# Patient Record
Sex: FEMALE | Race: WHITE | Employment: FULL TIME | URBAN - METROPOLITAN AREA
[De-identification: names, ages, dates, MRNs, and addresses within clinical notes are randomized per-mention and may not be internally consistent; named-entity substitution may affect disease eponyms.]

---

## 2017-04-10 ENCOUNTER — HOSPITAL ENCOUNTER (OUTPATIENT)
Dept: ULTRASOUND IMAGING | Age: 37
Discharge: HOME | End: 2017-04-10
Attending: OBSTETRICS & GYNECOLOGY | Admitting: OBSTETRICS & GYNECOLOGY

## 2017-04-10 DIAGNOSIS — N93.9 ABNORMAL UTERINE BLEEDING: ICD-10-CM

## 2017-04-25 PROBLEM — Z86.711 HISTORY OF PULMONARY EMBOLUS (PE): Status: ACTIVE | Noted: 2017-04-25

## 2017-04-25 PROBLEM — N93.9 ABNORMAL UTERINE BLEEDING (AUB): Status: ACTIVE | Noted: 2017-04-25

## 2017-04-28 ENCOUNTER — HOSPITAL ENCOUNTER (OUTPATIENT)
Dept: SURGERY | Age: 37
Discharge: HOME | End: 2017-04-28
Attending: OBSTETRICS & GYNECOLOGY | Admitting: OBSTETRICS & GYNECOLOGY

## 2017-04-28 VITALS
WEIGHT: 280 LBS | HEART RATE: 84 BPM | BODY MASS INDEX: 42.44 KG/M2 | HEIGHT: 68 IN | SYSTOLIC BLOOD PRESSURE: 135 MMHG | DIASTOLIC BLOOD PRESSURE: 87 MMHG | RESPIRATION RATE: 15 BRPM | TEMPERATURE: 98 F | OXYGEN SATURATION: 98 %

## 2017-04-28 LAB
HCT VFR BLD CALC: 32.3 % (ref 36–48)
HEMOGLOBIN: 10.6 G/DL (ref 12–16)
MCH RBC QN AUTO: 25.3 PG (ref 26–34)
MCHC RBC AUTO-ENTMCNC: 32.8 G/DL (ref 31–36)
MCV RBC AUTO: 77 FL (ref 80–100)
PDW BLD-RTO: 15.9 % (ref 12.4–15.4)
PLATELET # BLD: 255 K/UL (ref 135–450)
PMV BLD AUTO: 7.9 FL (ref 5–10.5)
PREGNANCY, URINE: NEGATIVE
RBC # BLD: 4.19 M/UL (ref 4–5.2)
WBC # BLD: 6.4 K/UL (ref 4–11)

## 2017-04-28 RX ORDER — HYDROMORPHONE HCL 110MG/55ML
0.5 PATIENT CONTROLLED ANALGESIA SYRINGE INTRAVENOUS EVERY 5 MIN PRN
Status: DISCONTINUED | OUTPATIENT
Start: 2017-04-28 | End: 2017-04-29 | Stop reason: HOSPADM

## 2017-04-28 RX ORDER — OXYCODONE HYDROCHLORIDE AND ACETAMINOPHEN 5; 325 MG/1; MG/1
1 TABLET ORAL PRN
Status: COMPLETED | OUTPATIENT
Start: 2017-04-28 | End: 2017-04-28

## 2017-04-28 RX ORDER — LABETALOL HYDROCHLORIDE 5 MG/ML
5 INJECTION, SOLUTION INTRAVENOUS EVERY 10 MIN PRN
Status: DISCONTINUED | OUTPATIENT
Start: 2017-04-28 | End: 2017-04-29 | Stop reason: HOSPADM

## 2017-04-28 RX ORDER — SODIUM CHLORIDE, SODIUM LACTATE, POTASSIUM CHLORIDE, CALCIUM CHLORIDE 600; 310; 30; 20 MG/100ML; MG/100ML; MG/100ML; MG/100ML
INJECTION, SOLUTION INTRAVENOUS CONTINUOUS
Status: DISCONTINUED | OUTPATIENT
Start: 2017-04-28 | End: 2017-04-28 | Stop reason: SDUPTHER

## 2017-04-28 RX ORDER — PROCHLORPERAZINE MALEATE 10 MG
10 TABLET ORAL EVERY 6 HOURS PRN
Qty: 8 TABLET | Refills: 0 | Status: SHIPPED | OUTPATIENT
Start: 2017-04-28 | End: 2018-01-02

## 2017-04-28 RX ORDER — SODIUM CHLORIDE 0.9 % (FLUSH) 0.9 %
10 SYRINGE (ML) INJECTION PRN
Status: DISCONTINUED | OUTPATIENT
Start: 2017-04-28 | End: 2017-04-29 | Stop reason: HOSPADM

## 2017-04-28 RX ORDER — SODIUM CHLORIDE, SODIUM LACTATE, POTASSIUM CHLORIDE, CALCIUM CHLORIDE 600; 310; 30; 20 MG/100ML; MG/100ML; MG/100ML; MG/100ML
INJECTION, SOLUTION INTRAVENOUS CONTINUOUS
Status: DISCONTINUED | OUTPATIENT
Start: 2017-04-28 | End: 2017-04-29 | Stop reason: HOSPADM

## 2017-04-28 RX ORDER — SODIUM CHLORIDE 0.9 % (FLUSH) 0.9 %
10 SYRINGE (ML) INJECTION EVERY 12 HOURS SCHEDULED
Status: DISCONTINUED | OUTPATIENT
Start: 2017-04-28 | End: 2017-04-29 | Stop reason: HOSPADM

## 2017-04-28 RX ORDER — MEPERIDINE HYDROCHLORIDE 25 MG/ML
12.5 INJECTION INTRAMUSCULAR; INTRAVENOUS; SUBCUTANEOUS EVERY 5 MIN PRN
Status: DISCONTINUED | OUTPATIENT
Start: 2017-04-28 | End: 2017-04-29 | Stop reason: HOSPADM

## 2017-04-28 RX ORDER — HYDROMORPHONE HCL 110MG/55ML
0.25 PATIENT CONTROLLED ANALGESIA SYRINGE INTRAVENOUS EVERY 5 MIN PRN
Status: DISCONTINUED | OUTPATIENT
Start: 2017-04-28 | End: 2017-04-29 | Stop reason: HOSPADM

## 2017-04-28 RX ORDER — HYDRALAZINE HYDROCHLORIDE 20 MG/ML
5 INJECTION INTRAMUSCULAR; INTRAVENOUS EVERY 10 MIN PRN
Status: DISCONTINUED | OUTPATIENT
Start: 2017-04-28 | End: 2017-04-29 | Stop reason: HOSPADM

## 2017-04-28 RX ORDER — SODIUM CHLORIDE 0.9 % (FLUSH) 0.9 %
10 SYRINGE (ML) INJECTION PRN
Status: DISCONTINUED | OUTPATIENT
Start: 2017-04-28 | End: 2017-04-28 | Stop reason: SDUPTHER

## 2017-04-28 RX ORDER — LIDOCAINE HYDROCHLORIDE 10 MG/ML
1 INJECTION, SOLUTION EPIDURAL; INFILTRATION; INTRACAUDAL; PERINEURAL
Status: COMPLETED | OUTPATIENT
Start: 2017-04-28 | End: 2017-04-28

## 2017-04-28 RX ORDER — ONDANSETRON 2 MG/ML
4 INJECTION INTRAMUSCULAR; INTRAVENOUS EVERY 10 MIN PRN
Status: DISCONTINUED | OUTPATIENT
Start: 2017-04-28 | End: 2017-04-29 | Stop reason: HOSPADM

## 2017-04-28 RX ORDER — SODIUM CHLORIDE 0.9 % (FLUSH) 0.9 %
10 SYRINGE (ML) INJECTION EVERY 12 HOURS SCHEDULED
Status: DISCONTINUED | OUTPATIENT
Start: 2017-04-28 | End: 2017-04-28 | Stop reason: SDUPTHER

## 2017-04-28 RX ORDER — HYDROCODONE BITARTRATE AND ACETAMINOPHEN 5; 325 MG/1; MG/1
1 TABLET ORAL EVERY 6 HOURS PRN
Qty: 12 TABLET | Refills: 0 | Status: SHIPPED | OUTPATIENT
Start: 2017-04-28 | End: 2017-05-05

## 2017-04-28 RX ORDER — OXYCODONE HYDROCHLORIDE AND ACETAMINOPHEN 5; 325 MG/1; MG/1
2 TABLET ORAL PRN
Status: COMPLETED | OUTPATIENT
Start: 2017-04-28 | End: 2017-04-28

## 2017-04-28 RX ADMIN — SODIUM CHLORIDE, SODIUM LACTATE, POTASSIUM CHLORIDE, CALCIUM CHLORIDE: 600; 310; 30; 20 INJECTION, SOLUTION INTRAVENOUS at 10:56

## 2017-04-28 RX ADMIN — LIDOCAINE HYDROCHLORIDE 1 ML: 10 INJECTION, SOLUTION EPIDURAL; INFILTRATION; INTRACAUDAL; PERINEURAL at 10:56

## 2017-04-28 RX ADMIN — OXYCODONE HYDROCHLORIDE AND ACETAMINOPHEN 1 TABLET: 5; 325 TABLET ORAL at 12:49

## 2017-04-28 ASSESSMENT — PAIN DESCRIPTION - LOCATION: LOCATION: BACK

## 2017-04-28 ASSESSMENT — PAIN SCALES - GENERAL
PAINLEVEL_OUTOF10: 5
PAINLEVEL_OUTOF10: 3
PAINLEVEL_OUTOF10: 5
PAINLEVEL_OUTOF10: 3
PAINLEVEL_OUTOF10: 3

## 2017-04-28 ASSESSMENT — PAIN DESCRIPTION - PAIN TYPE: TYPE: CHRONIC PAIN

## 2017-04-28 ASSESSMENT — PAIN - FUNCTIONAL ASSESSMENT: PAIN_FUNCTIONAL_ASSESSMENT: 0-10

## 2017-04-28 ASSESSMENT — PAIN DESCRIPTION - DESCRIPTORS: DESCRIPTORS: SORE

## 2017-04-28 ASSESSMENT — PAIN DESCRIPTION - ORIENTATION: ORIENTATION: LOWER

## 2017-12-29 ENCOUNTER — SURG/PROC ORDERS (OUTPATIENT)
Dept: OBGYN | Age: 37
End: 2017-12-29

## 2017-12-29 RX ORDER — PHENAZOPYRIDINE HYDROCHLORIDE 100 MG/1
100 TABLET, FILM COATED ORAL
Status: CANCELLED | OUTPATIENT
Start: 2017-12-29 | End: 2017-12-30

## 2017-12-29 RX ORDER — SODIUM CHLORIDE 0.9 % (FLUSH) 0.9 %
10 SYRINGE (ML) INJECTION PRN
Status: CANCELLED | OUTPATIENT
Start: 2017-12-29

## 2017-12-29 RX ORDER — HEPARIN SODIUM 5000 [USP'U]/ML
5000 INJECTION, SOLUTION INTRAVENOUS; SUBCUTANEOUS ONCE
Status: CANCELLED | OUTPATIENT
Start: 2017-12-29

## 2017-12-29 RX ORDER — SODIUM CHLORIDE 0.9 % (FLUSH) 0.9 %
10 SYRINGE (ML) INJECTION EVERY 12 HOURS SCHEDULED
Status: CANCELLED | OUTPATIENT
Start: 2017-12-29

## 2017-12-29 NOTE — H&P
Negative Burning eyes, eye discharge, eye redness and vision changes. Respiratory Negative Dyspnea, hemoptysis and wheezing. Cardio Negative Chest pain and irregular heartbeat/palpitations. GI Negative Abdominal pain, change in bowel habits and nausea.  Negative Change in urine color, dysuria and hematuria. Endocrine Negative Change in sleep/awake pattern. Neuro Negative Aphasia, dizziness and gait disturbance. Integumentary Negative Photosensitivity and rash. MS Negative Bone/joint symptoms, muscle weakness and numbness in extremity. Hema/Lymph Negative Easy bleeding and lymphadenopathy. Reproductive Negative Breast discharge, breast lump(s), breast pain and dyspareunia. VITAL SIGNS   Time BP mm/Hg Pulse /min Resp /min Temp F Ht ft Ht in Ht cm Wt lb Wt oz BMI kg/m2 BSA m2 O2 Sat%   3:09 /80    5.0 9.00 175.26 279.00  41.20       COMMENTS  Time Comments   3:09 PM Pt. here for Pre-op for [address]. Pt. states she has a history of blood clots, wanted to know if she needs to stop her asprin 325mg prior to surgery. MEASURED BY  Time Measured by   3:09 PM Janelle Juan David RMA     Physical Exam  Exam Findings Details   Constitutional Normal Well developed. Respiratory Normal Auscultation - Normal.   Cardiovascular Normal Regular rhythm. No murmurs, gallops, or rubs. Abdomen Normal Anterior palpation -  No rebound. No abdominal tenderness. No hernia. Skin Normal Inspection - Normal.   Psychiatric Normal Oriented to time, place, person and situation. Appropriate mood and affect. Assessment/Plan  # Detail Type Description    1. Assessment Abnormal uterine bleeding (N93.9). Impression Previously discussed alternative options to surgery and pt continues to desire definitive hysterectomy. Will plan for pre and postoperative heparin for anticoagulation prophylaxis.  Discussed ovarian removal or retention and at pt age recommend retention given she would not be candidate for add back and pt agrees. Recommend robotic laparoscopic hysterectomy due to obesity and prior c/S. Risks of bleeding, infection, damage to surrounding organs, DVT, ileus reviewed. Questions answered and postop instructions reviewed. Pt med list and plan reviewed. 2. Assessment Body mass index (BMI) 40.0-44.9, adult (Z68.41).               Medications (added or continued this visit):  Started Medication Directions Instruction Stopped   11/28/2016 aspirin 325 mg tablet,delayed release take 1 tablet by oral route  every day     11/03/2017 famotidine 20 mg tablet take 1 tablet by oral route  every day     10/12/2017 levothyroxine 75 mcg tablet take 1 tablet by oral route  every day     11/03/2017 metformin 1,000 mg tablet take 1 tablet by oral route 2 times every day with morning and evening meals     09/18/2017 Vitamin B-12 1,000 mcg tablet take 1 Tablet by Oral route  every day       Rendering Provider:  Laura Hoff MD    Electronically signed by:  Laura Hoff MD  12/29/2017 04:55 PM  Document generated by:  Laura Hoff 12/29/2017 04:54 PM  [address]  [address] [place], [place] [address]Lady villanueva   Phone: [phone]  Fax: [phone]    Electronically signed by Laura Hoff MD on 12/29/2017 04:55 PM

## 2018-01-09 PROBLEM — Z90.710 S/P HYSTERECTOMY: Status: ACTIVE | Noted: 2018-01-09

## 2019-01-02 ENCOUNTER — HOSPITAL ENCOUNTER (EMERGENCY)
Age: 39
Discharge: HOME | End: 2019-01-02
Attending: EMERGENCY MEDICINE
Payer: COMMERCIAL

## 2019-01-02 ENCOUNTER — APPOINTMENT (OUTPATIENT)
Dept: GENERAL RADIOLOGY | Age: 39
End: 2019-01-02
Payer: COMMERCIAL

## 2019-01-02 VITALS
HEIGHT: 69 IN | SYSTOLIC BLOOD PRESSURE: 135 MMHG | OXYGEN SATURATION: 99 % | RESPIRATION RATE: 16 BRPM | TEMPERATURE: 98 F | BODY MASS INDEX: 38.8 KG/M2 | DIASTOLIC BLOOD PRESSURE: 80 MMHG | WEIGHT: 262 LBS | HEART RATE: 83 BPM

## 2019-01-02 DIAGNOSIS — S39.012A STRAIN OF LUMBAR REGION, INITIAL ENCOUNTER: Primary | ICD-10-CM

## 2019-01-02 PROCEDURE — 6370000000 HC RX 637 (ALT 250 FOR IP): Performed by: EMERGENCY MEDICINE

## 2019-01-02 PROCEDURE — 72100 X-RAY EXAM L-S SPINE 2/3 VWS: CPT

## 2019-01-02 PROCEDURE — 96372 THER/PROPH/DIAG INJ SC/IM: CPT

## 2019-01-02 PROCEDURE — 99283 EMERGENCY DEPT VISIT LOW MDM: CPT

## 2019-01-02 PROCEDURE — 6360000002 HC RX W HCPCS

## 2019-01-02 RX ORDER — CYCLOBENZAPRINE HCL 10 MG
10 TABLET ORAL 3 TIMES DAILY PRN
Qty: 10 TABLET | Refills: 0 | Status: SHIPPED | OUTPATIENT
Start: 2019-01-02 | End: 2019-01-12

## 2019-01-02 RX ORDER — KETOROLAC TROMETHAMINE 30 MG/ML
30 INJECTION, SOLUTION INTRAMUSCULAR; INTRAVENOUS ONCE
Status: DISCONTINUED | OUTPATIENT
Start: 2019-01-02 | End: 2019-01-03 | Stop reason: HOSPADM

## 2019-01-02 RX ORDER — CYCLOBENZAPRINE HCL 10 MG
10 TABLET ORAL ONCE
Status: COMPLETED | OUTPATIENT
Start: 2019-01-02 | End: 2019-01-02

## 2019-01-02 RX ORDER — KETOROLAC TROMETHAMINE 30 MG/ML
INJECTION, SOLUTION INTRAMUSCULAR; INTRAVENOUS
Status: COMPLETED
Start: 2019-01-02 | End: 2019-01-02

## 2019-01-02 RX ADMIN — KETOROLAC TROMETHAMINE 30 MG: 60 INJECTION, SOLUTION INTRAMUSCULAR at 21:24

## 2019-01-02 RX ADMIN — CYCLOBENZAPRINE HYDROCHLORIDE 10 MG: 10 TABLET, FILM COATED ORAL at 21:24

## 2019-01-02 ASSESSMENT — PAIN SCALES - GENERAL
PAINLEVEL_OUTOF10: 7
PAINLEVEL_OUTOF10: 8

## 2019-01-02 ASSESSMENT — PAIN DESCRIPTION - ORIENTATION: ORIENTATION: LOWER

## 2019-01-02 ASSESSMENT — PAIN DESCRIPTION - LOCATION: LOCATION: BACK

## 2019-01-02 ASSESSMENT — PAIN DESCRIPTION - DESCRIPTORS: DESCRIPTORS: ACHING

## 2019-01-02 ASSESSMENT — PAIN DESCRIPTION - FREQUENCY: FREQUENCY: CONTINUOUS

## 2019-01-02 ASSESSMENT — PAIN DESCRIPTION - PAIN TYPE: TYPE: ACUTE PAIN

## 2019-07-19 ENCOUNTER — HOSPITAL ENCOUNTER (OUTPATIENT)
Dept: MAMMOGRAPHY | Age: 39
Discharge: HOME | End: 2019-07-19
Payer: COMMERCIAL

## 2019-07-19 DIAGNOSIS — Z12.31 ENCOUNTER FOR SCREENING MAMMOGRAM FOR BREAST CANCER: ICD-10-CM

## 2019-07-19 PROCEDURE — 77063 BREAST TOMOSYNTHESIS BI: CPT

## 2019-08-01 ENCOUNTER — HOSPITAL ENCOUNTER (EMERGENCY)
Age: 39
Discharge: HOME | End: 2019-08-01
Attending: EMERGENCY MEDICINE
Payer: COMMERCIAL

## 2019-08-01 ENCOUNTER — APPOINTMENT (OUTPATIENT)
Dept: CT IMAGING | Age: 39
End: 2019-08-01
Payer: COMMERCIAL

## 2019-08-01 VITALS
TEMPERATURE: 99.2 F | OXYGEN SATURATION: 100 % | HEART RATE: 72 BPM | DIASTOLIC BLOOD PRESSURE: 76 MMHG | RESPIRATION RATE: 16 BRPM | HEIGHT: 69 IN | BODY MASS INDEX: 37.03 KG/M2 | WEIGHT: 250 LBS | SYSTOLIC BLOOD PRESSURE: 118 MMHG

## 2019-08-01 DIAGNOSIS — R10.13 ABDOMINAL PAIN, EPIGASTRIC: Primary | ICD-10-CM

## 2019-08-01 LAB
A/G RATIO: 1.2 (ref 1.1–2.2)
ALBUMIN SERPL-MCNC: 4 G/DL (ref 3.4–5)
ALP BLD-CCNC: 81 U/L (ref 40–129)
ALT SERPL-CCNC: 23 U/L (ref 10–40)
ANION GAP SERPL CALCULATED.3IONS-SCNC: 13 MMOL/L (ref 3–16)
AST SERPL-CCNC: 26 U/L (ref 15–37)
BASOPHILS ABSOLUTE: 0 K/UL (ref 0–0.2)
BASOPHILS RELATIVE PERCENT: 0.3 %
BILIRUB SERPL-MCNC: 0.5 MG/DL (ref 0–1)
BILIRUBIN URINE: NEGATIVE
BLOOD, URINE: NEGATIVE
BUN BLDV-MCNC: 12 MG/DL (ref 7–20)
CALCIUM SERPL-MCNC: 9.1 MG/DL (ref 8.3–10.6)
CHLORIDE BLD-SCNC: 98 MMOL/L (ref 99–110)
CLARITY: CLEAR
CO2: 24 MMOL/L (ref 21–32)
COLOR: YELLOW
CREAT SERPL-MCNC: 0.6 MG/DL (ref 0.6–1.1)
EOSINOPHILS ABSOLUTE: 0 K/UL (ref 0–0.6)
EOSINOPHILS RELATIVE PERCENT: 0.1 %
GFR AFRICAN AMERICAN: >60
GFR NON-AFRICAN AMERICAN: >60
GLOBULIN: 3.3 G/DL
GLUCOSE BLD-MCNC: 98 MG/DL (ref 70–99)
GLUCOSE URINE: NEGATIVE MG/DL
HCT VFR BLD CALC: 36.8 % (ref 36–48)
HEMOGLOBIN: 12.1 G/DL (ref 12–16)
KETONES, URINE: NEGATIVE MG/DL
LEUKOCYTE ESTERASE, URINE: NEGATIVE
LIPASE: 29 U/L (ref 13–60)
LYMPHOCYTES ABSOLUTE: 0.6 K/UL (ref 1–5.1)
LYMPHOCYTES RELATIVE PERCENT: 7.4 %
MCH RBC QN AUTO: 24.7 PG (ref 26–34)
MCHC RBC AUTO-ENTMCNC: 32.8 G/DL (ref 31–36)
MCV RBC AUTO: 75.3 FL (ref 80–100)
MICROSCOPIC EXAMINATION: NORMAL
MONOCYTES ABSOLUTE: 0.3 K/UL (ref 0–1.3)
MONOCYTES RELATIVE PERCENT: 3.4 %
NEUTROPHILS ABSOLUTE: 7.6 K/UL (ref 1.7–7.7)
NEUTROPHILS RELATIVE PERCENT: 88.8 %
NITRITE, URINE: NEGATIVE
PDW BLD-RTO: 17.3 % (ref 12.4–15.4)
PH UA: 6 (ref 5–8)
PLATELET # BLD: 250 K/UL (ref 135–450)
PMV BLD AUTO: 8 FL (ref 5–10.5)
POTASSIUM SERPL-SCNC: 3.7 MMOL/L (ref 3.5–5.1)
PROTEIN UA: NEGATIVE MG/DL
RBC # BLD: 4.88 M/UL (ref 4–5.2)
SODIUM BLD-SCNC: 135 MMOL/L (ref 136–145)
SPECIFIC GRAVITY UA: <=1.005 (ref 1–1.03)
TOTAL PROTEIN: 7.3 G/DL (ref 6.4–8.2)
URINE REFLEX TO CULTURE: NORMAL
URINE TYPE: NORMAL
UROBILINOGEN, URINE: 0.2 E.U./DL
WBC # BLD: 8.6 K/UL (ref 4–11)

## 2019-08-01 PROCEDURE — 85025 COMPLETE CBC W/AUTO DIFF WBC: CPT

## 2019-08-01 PROCEDURE — 81003 URINALYSIS AUTO W/O SCOPE: CPT

## 2019-08-01 PROCEDURE — 2580000003 HC RX 258: Performed by: EMERGENCY MEDICINE

## 2019-08-01 PROCEDURE — 74177 CT ABD & PELVIS W/CONTRAST: CPT

## 2019-08-01 PROCEDURE — 83690 ASSAY OF LIPASE: CPT

## 2019-08-01 PROCEDURE — 80053 COMPREHEN METABOLIC PANEL: CPT

## 2019-08-01 PROCEDURE — 6360000004 HC RX CONTRAST MEDICATION: Performed by: EMERGENCY MEDICINE

## 2019-08-01 PROCEDURE — 6370000000 HC RX 637 (ALT 250 FOR IP): Performed by: EMERGENCY MEDICINE

## 2019-08-01 PROCEDURE — 99284 EMERGENCY DEPT VISIT MOD MDM: CPT

## 2019-08-01 PROCEDURE — 6360000002 HC RX W HCPCS: Performed by: EMERGENCY MEDICINE

## 2019-08-01 RX ORDER — FERROUS SULFATE 325(65) MG
325 TABLET ORAL
COMMUNITY

## 2019-08-01 RX ORDER — 0.9 % SODIUM CHLORIDE 0.9 %
1000 INTRAVENOUS SOLUTION INTRAVENOUS ONCE
Status: COMPLETED | OUTPATIENT
Start: 2019-08-01 | End: 2019-08-01

## 2019-08-01 RX ORDER — FAMOTIDINE 20 MG/1
20 TABLET, FILM COATED ORAL 2 TIMES DAILY
Qty: 30 TABLET | Refills: 0 | Status: SHIPPED | OUTPATIENT
Start: 2019-08-01 | End: 2020-08-17 | Stop reason: ALTCHOICE

## 2019-08-01 RX ORDER — PANTOPRAZOLE SODIUM 40 MG/1
40 TABLET, DELAYED RELEASE ORAL DAILY
Qty: 30 TABLET | Refills: 1 | Status: SHIPPED | OUTPATIENT
Start: 2019-08-01

## 2019-08-01 RX ORDER — KETOROLAC TROMETHAMINE 30 MG/ML
30 INJECTION, SOLUTION INTRAMUSCULAR; INTRAVENOUS ONCE
Status: COMPLETED | OUTPATIENT
Start: 2019-08-01 | End: 2019-08-01

## 2019-08-01 RX ORDER — ONDANSETRON 2 MG/ML
4 INJECTION INTRAMUSCULAR; INTRAVENOUS ONCE
Status: COMPLETED | OUTPATIENT
Start: 2019-08-01 | End: 2019-08-01

## 2019-08-01 RX ADMIN — ONDANSETRON 4 MG: 2 INJECTION INTRAMUSCULAR; INTRAVENOUS at 18:15

## 2019-08-01 RX ADMIN — LIDOCAINE HYDROCHLORIDE: 20 SOLUTION ORAL; TOPICAL at 20:18

## 2019-08-01 RX ADMIN — IOPAMIDOL 75 ML: 755 INJECTION, SOLUTION INTRAVENOUS at 18:36

## 2019-08-01 RX ADMIN — KETOROLAC TROMETHAMINE 30 MG: 30 INJECTION, SOLUTION INTRAMUSCULAR at 18:15

## 2019-08-01 RX ADMIN — SODIUM CHLORIDE 1000 ML: 9 INJECTION, SOLUTION INTRAVENOUS at 18:15

## 2019-08-01 ASSESSMENT — PAIN SCALES - GENERAL
PAINLEVEL_OUTOF10: 7

## 2019-08-01 ASSESSMENT — PAIN DESCRIPTION - LOCATION
LOCATION: ABDOMEN
LOCATION: ABDOMEN

## 2019-08-01 ASSESSMENT — PAIN DESCRIPTION - PAIN TYPE
TYPE: ACUTE PAIN
TYPE: ACUTE PAIN

## 2019-08-01 ASSESSMENT — PAIN DESCRIPTION - ORIENTATION
ORIENTATION: RIGHT
ORIENTATION: RIGHT

## 2019-08-01 ASSESSMENT — PAIN DESCRIPTION - DESCRIPTORS: DESCRIPTORS: CONSTANT

## 2019-08-02 NOTE — ED NOTES
Discharge instructions reviewed with Ms. Zain Smart. She verbalized understanding. Copy of discharge instructions and prescriptions given. Ms. Zain Smart was discharged to home in good condition per personal vehicle, friend/family driving. She exited the ED without difficulty.         Markell Leblanc RN  08/01/19 2024

## 2019-08-02 NOTE — ED PROVIDER NOTES
Triage Chief Complaint:    Abdominal Pain (right side abdomial pain started this am, c/o nausea and vomiting)    [place]:  Ash Abernathy is a 34 y.o. female that presents to the emergency department with complaints of having right upper quadrant/midepigastric abdominal pain. The patient started with the main part of the pain at home. The patient states it started earlier this morning. He did wake her from sleep. The patient states been going on throughout the day. Patient states it has not waxed and wanes in severity. The patient states that he did start while she was sleeping. The patient states that she had eaten last approximately 6 PM the night before. Patient states that there was some episodes of vomiting this morning, but nothing else significant. .    ROS:  At least 10 systems reviewed and otherwise acutely negative except as in the [address] Past Medical History:   Diagnosis Date    DVT (deep vein thrombosis) in pregnancy ([facility] [address]     Gall stones     PE (pulmonary embolism)     Polycystic ovarian disease     Thyroid disease      Past Surgical History:   Procedure Laterality Date    BACK SURGERY       SECTION      HYSTERECTOMY  2018    Lap    HYSTEROSCOPY N/A 2017    DILATATION AND CURETTAGE    TONSILLECTOMY AND ADENOIDECTOMY      TUBAL LIGATION       History reviewed. No pertinent family history.   Social History     Socioeconomic History    Marital status:      Spouse name: Not on file    Number of children: Not on file    Years of education: Not on file    Highest education level: Not on file   Occupational History    Not on file   Social Needs    Financial resource strain: Not on file    Food insecurity:     Worry: Not on file     Inability: Not on file    Transportation needs:     Medical: Not on file     Non-medical: Not on file   Tobacco Use    Smoking status: Former Smoker     Packs/day: 0.00     Types: Cigarettes    Smokeless tobacco: Former User  Tobacco comment: 1/28/16   Substance and Sexual Activity    Alcohol use: No    Drug use: No    Sexual activity: Yes     Partners: Male   Lifestyle    Physical activity:     Days per week: Not on file     Minutes per session: Not on file    Stress: Not on file   Relationships    Social connections:     Talks on phone: Not on file     Gets together: Not on file     Attends Yarsani service: Not on file     Active member of club or organization: Not on file     Attends meetings of clubs or organizations: Not on file     Relationship status: Not on file    Intimate partner violence:     Fear of current or ex partner: Not on file     Emotionally abused: Not on file     Physically abused: Not on file     Forced sexual activity: Not on file   Other Topics Concern    Not on file   Social History Narrative    Not on file     Current Facility-Administered Medications   Medication Dose Route Frequency Provider Last Rate Last Dose    aluminum & magnesium hydroxide-simethicone (MAALOX) 30 mL, lidocaine viscous hcl (XYLOCAINE) 5 mL (GI COCKTAIL)   Oral Once Job Tripathi MD         Current Outpatient Medications   Medication Sig Dispense Refill    Cetirizine HCl (ZYRTEC PO) Take by mouth      ferrous sulfate 325 (65 Fe) MG tablet Take 325 mg by mouth daily (with breakfast)      Bisacodyl (DULCOLAX PO) Take by mouth      fluticasone (VERAMYST) 27.5 MCG/SPRAY nasal spray 2 sprays by Each Nostril route daily      famotidine (PEPCID) 20 MG tablet Take 1 tablet by mouth 2 times daily 30 tablet 0    pantoprazole (PROTONIX) 40 MG tablet Take 1 tablet by mouth daily 30 tablet 1    metFORMIN (GLUCOPHAGE) 1000 MG tablet Take 1,000 mg by mouth 2 times daily (with meals)      vitamin B-12 (CYANOCOBALAMIN) 1000 MCG tablet Take 1,000 mcg by mouth daily      famotidine (PEPCID) 40 MG tablet Take 40 mg by mouth daily      aspirin 325 MG EC tablet Take 325 mg by mouth daily      levothyroxine (SYNTHROID) 50 MCG Value Ref Range    Sodium 135 (L) 136 - 145 mmol/L    Potassium 3.7 3.5 - 5.1 mmol/L    Chloride 98 (L) 99 - 110 mmol/L    CO2 24 21 - 32 mmol/L    Anion Gap 13 3 - 16    Glucose 98 70 - 99 mg/dL    BUN 12 7 - 20 mg/dL    CREATININE 0.6 0.6 - 1.1 mg/dL    GFR Non-African American >60 >60    GFR African American >60 >60    Calcium 9.1 8.3 - 10.6 mg/dL    Total Protein 7.3 6.4 - 8.2 g/dL    Alb 4.0 3.4 - 5.0 g/dL    Albumin/Globulin Ratio 1.2 1.1 - 2.2    Total Bilirubin 0.5 0.0 - 1.0 mg/dL    Alkaline Phosphatase 81 40 - 129 U/L    ALT 23 10 - 40 U/L    AST 26 15 - 37 U/L    Globulin 3.3 g/dL   Lipase   Result Value Ref Range    Lipase 29.0 13.0 - 60.0 U/L   Urine, reflex to culture   Result Value Ref Range    Color, UA Yellow Straw/Yellow    Clarity, UA Clear Clear    Glucose, Ur Negative Negative mg/dL    Bilirubin Urine Negative Negative    Ketones, Urine Negative Negative mg/dL    Specific Gravity, UA <=1.005 1.005 - 1.030    Blood, Urine Negative Negative    pH, UA 6.0 5.0 - 8.0    Protein, UA Negative Negative mg/dL    Urobilinogen, Urine 0.2 <2.0 E.U./dL    Nitrite, Urine Negative Negative    Leukocyte Esterase, Urine Negative Negative    Microscopic Examination Not Indicated     Urine Reflex to Culture Not Indicated     Urine Type Not Specified         Radiographs (if obtained):  [] The following radiograph was interpreted by myself in the absence of a radiologist:  [x] Radiologist's Report Reviewed:  CT ABDOMEN PELVIS W IV CONTRAST Additional Contrast? None   Final Result   No acute intra-abdominal abnormality      No change in left adnexal mass since 2016. This has been previously   evaluated by ultrasound      Cholelithiasis             EKG (if obtained): (All EKG's are interpreted by myself in theabsence of a cardiologist)    Procedures    MDM:  After my initial evaluation, the patient does not appear to have any signs of McBurney's point tenderness. Patient also does not have a Pascual sign.   Patient is

## 2020-06-24 ENCOUNTER — HOSPITAL ENCOUNTER (OUTPATIENT)
Dept: GENERAL RADIOLOGY | Age: 40
Discharge: HOME | End: 2020-06-24
Payer: COMMERCIAL

## 2020-06-24 ENCOUNTER — HOSPITAL ENCOUNTER (OUTPATIENT)
Age: 40
Discharge: HOME | End: 2020-06-24
Payer: COMMERCIAL

## 2020-06-24 PROCEDURE — 73630 X-RAY EXAM OF FOOT: CPT

## 2020-08-17 RX ORDER — CITALOPRAM 40 MG/1
40 TABLET ORAL DAILY
COMMUNITY

## 2020-08-17 NOTE — PROGRESS NOTES
PRE OP INSTRUCTION SHEET   1. Do not eat or drink anything after 12 midnight  prior to surgery. This includes no water, chewing gum or mints. 2. Take the following pills will a small sip of water (see MAR)                                        3. Aspirin, Ibuprofen, Advil, Naproxen, Vitamin E, fish oil and other Anti-inflammatory products should be stopped for 5 days before surgery or as directed by your physician. 4. Check with your Doctor regarding stopping Plavix, Coumadin, Lovenox, Fragmin or other blood thinners   5. Do not smoke, and do not drink any alcoholic beverages 24 hours prior to surgery. This includes NA Beer. 6. You may brush your teeth and gargle the morning of surgery. DO NOT SWALLOW WATER   7. You MUST make arrangements for a responsible adult to take you home after your surgery. You will not be allowed to leave alone or drive yourself home. It is strongly suggested someone stay with you the first 24 hrs. Your surgery will be cancelled if you do not have a ride home. 8. A parent/legal guardian must accompany a child scheduled for surgery and plan to stay at the hospital until the child is discharged. Please do not bring other children with you. 9. Please wear simple, loose fitting clothing to the hospital.  Renu Greenhouse not bring valuables (money, credit cards, checkbooks, etc.) Do not wear any makeup (including no eye makeup) or nail polish on your fingers or toes. 10. DO NOT wear any jewelry or piercings on day of surgery. All body piercing jewelry must be removed. 11. If you have dentures,glasses, or contacts they will be removed before going to the OR; we will provide you a container. 12. Please see your family doctor/and cardiologist for a history & physical and/or concerning medications. Bring any test results/reports from your physician's office. Have history and labs faxed to [phone].  Remember to bring Blood Bank bracelet on the day of surgery. 14. If you have a Living Will and Durable Power of  for Healthcare, please bring in a copy. 13. Notify your Surgeon if you develop any illness between now and surgery  time, cough, cold, fever, sore throat, nausea, vomiting, etc.  Please notify your surgeon if you experience dizziness, shortness of breath or blurred vision between now & the time of your surgery   16. DO NOT shave your operative site 96 hours prior to surgery. For face & neck surgery, men may use an electric razor 48 hours prior to surgery. 17. Shower with _x__Antibacterial soap (x_chlorhexidine for total joint  Pt's) shower two times before surgery.(the morning of and the night before. 18. To provide excellent care visitors will be limited to one in the room at any given time.   Please call pre admission testing if you any further questions [phone] or [phone]

## 2020-08-18 ENCOUNTER — OFFICE VISIT (OUTPATIENT)
Dept: PRIMARY CARE CLINIC | Age: 40
End: 2020-08-18

## 2020-08-18 NOTE — PATIENT INSTRUCTIONS
Advance Care Planning  People with COVID-19 may have no symptoms, mild symptoms, such as fever, cough, and shortness of breath or they may have more severe illness, developing severe and fatal pneumonia. As a result, Advance Care Planning with attention to naming a health care decision maker (someone you trust to make healthcare decisions for you if you could not speak for yourself) and sharing other health care preferences is important BEFORE a possible health crisis. Please contact your Primary Care Provider to discuss Advance Care Planning. Preventing the Spread of Coronavirus Disease 2019 in Homes and Residential Communities  For the most recent information go to [email or web address]    Prevention steps for People with confirmed or suspected COVID-19 (including persons under investigation) who do not need to be hospitalized  and   People with confirmed COVID-19 who were hospitalized and determined to be medically stable to go home    Your healthcare provider and public health staff will evaluate whether you can be cared for at home. If it is determined that you do not need to be hospitalized and can be isolated at home, you will be monitored by staff from your local or state health department. You should follow the prevention steps below until a healthcare provider or local or state health department says you can return to your normal activities. Stay home except to get medical care  People who are mildly ill with COVID-19 are able to isolate at home during their illness. You should restrict activities outside your home, except for getting medical care. Do not go to work, school, or public areas. Avoid using public transportation, ride-sharing, or taxis. Separate yourself from other people and animals in your home  People: As much as possible, you should stay in a specific room and away from other people in your home.  Also, you should use a separate bathroom, if available. Animals: You should restrict contact with pets and other animals while you are sick with COVID-19, just like you would around other people. Although there have not been reports of pets or other animals becoming sick with COVID-19, it is still recommended that people sick with COVID-19 limit contact with animals until more information is known about the virus. When possible, have another member of your household care for your animals while you are sick. If you are sick with COVID-19, avoid contact with your pet, including petting, snuggling, being kissed or licked, and sharing food. If you must care for your pet or be around animals while you are sick, wash your hands before and after you interact with pets and wear a facemask. Call ahead before visiting your doctor  If you have a medical appointment, call the healthcare provider and tell them that you have or may have COVID-19. This will help the healthcare providers office take steps to keep other people from getting infected or exposed. Wear a facemask  You should wear a facemask when you are around other people (e.g., sharing a room or vehicle) or pets and before you enter a healthcare providers office. If you are not able to wear a facemask (for example, because it causes trouble breathing), then people who live with you should not stay in the same room with you, or they should wear a facemask if they enter your room. Cover your coughs and sneezes  Cover your mouth and nose with a tissue when you cough or sneeze. Throw used tissues in a lined trash can. Immediately wash your hands with soap and water for at least 20 seconds or, if soap and water are not available, clean your hands with an alcohol-based hand  that contains at least 60% alcohol.   Clean your hands often  Wash your hands often with soap and water for at least 20 seconds, especially after blowing your nose, coughing, or sneezing; going to the bathroom; and

## 2020-08-18 NOTE — PROGRESS NOTES
Josh Cooley received a viral test for COVID-19. They were educated on isolation and quarantine as appropriate. For any symptoms, they were directed to seek care from their PCP, given contact information to establish with a doctor, directed to an urgent care or the emergency room.

## 2020-08-19 ENCOUNTER — ANESTHESIA EVENT (OUTPATIENT)
Dept: OPERATING ROOM | Age: 40
End: 2020-08-19
Payer: COMMERCIAL

## 2020-08-19 LAB — SARS-COV-2, NAA: NOT DETECTED

## 2020-08-20 ENCOUNTER — HOSPITAL ENCOUNTER (OUTPATIENT)
Age: 40
Setting detail: OUTPATIENT SURGERY
Discharge: HOME | End: 2020-08-20
Attending: PODIATRIST | Admitting: PODIATRIST
Payer: COMMERCIAL

## 2020-08-20 ENCOUNTER — APPOINTMENT (OUTPATIENT)
Dept: GENERAL RADIOLOGY | Age: 40
End: 2020-08-20
Attending: PODIATRIST
Payer: COMMERCIAL

## 2020-08-20 ENCOUNTER — ANESTHESIA (OUTPATIENT)
Dept: OPERATING ROOM | Age: 40
End: 2020-08-20
Payer: COMMERCIAL

## 2020-08-20 VITALS
RESPIRATION RATE: 10 BRPM | OXYGEN SATURATION: 97 % | DIASTOLIC BLOOD PRESSURE: 65 MMHG | SYSTOLIC BLOOD PRESSURE: 106 MMHG

## 2020-08-20 VITALS
SYSTOLIC BLOOD PRESSURE: 109 MMHG | RESPIRATION RATE: 20 BRPM | BODY MASS INDEX: 31.4 KG/M2 | DIASTOLIC BLOOD PRESSURE: 73 MMHG | HEART RATE: 64 BPM | OXYGEN SATURATION: 100 % | TEMPERATURE: 97.4 F | WEIGHT: 212 LBS | HEIGHT: 69 IN

## 2020-08-20 PROCEDURE — 2580000003 HC RX 258: Performed by: PODIATRIST

## 2020-08-20 PROCEDURE — 3700000000 HC ANESTHESIA ATTENDED CARE: Performed by: PODIATRIST

## 2020-08-20 PROCEDURE — 73620 X-RAY EXAM OF FOOT: CPT

## 2020-08-20 PROCEDURE — 6360000002 HC RX W HCPCS: Performed by: NURSE ANESTHETIST, CERTIFIED REGISTERED

## 2020-08-20 PROCEDURE — 2580000003 HC RX 258: Performed by: ANESTHESIOLOGY

## 2020-08-20 PROCEDURE — 2500000003 HC RX 250 WO HCPCS: Performed by: PODIATRIST

## 2020-08-20 PROCEDURE — 2709999900 HC NON-CHARGEABLE SUPPLY: Performed by: PODIATRIST

## 2020-08-20 PROCEDURE — 3700000001 HC ADD 15 MINUTES (ANESTHESIA): Performed by: PODIATRIST

## 2020-08-20 PROCEDURE — 2500000003 HC RX 250 WO HCPCS: Performed by: NURSE ANESTHETIST, CERTIFIED REGISTERED

## 2020-08-20 PROCEDURE — 3600000013 HC SURGERY LEVEL 3 ADDTL 15MIN: Performed by: PODIATRIST

## 2020-08-20 PROCEDURE — 2780000010 HC IMPLANT OTHER: Performed by: PODIATRIST

## 2020-08-20 PROCEDURE — 7100000011 HC PHASE II RECOVERY - ADDTL 15 MIN: Performed by: PODIATRIST

## 2020-08-20 PROCEDURE — 7100000010 HC PHASE II RECOVERY - FIRST 15 MIN: Performed by: PODIATRIST

## 2020-08-20 PROCEDURE — 6360000002 HC RX W HCPCS: Performed by: PODIATRIST

## 2020-08-20 PROCEDURE — 3600000003 HC SURGERY LEVEL 3 BASE: Performed by: PODIATRIST

## 2020-08-20 DEVICE — ALLOGRAFT HUM TISS 1 CC AMNIO TISS MEMBRN AMNIFLO CRYOPRES: Type: IMPLANTABLE DEVICE | Site: FOOT | Status: FUNCTIONAL

## 2020-08-20 RX ORDER — MORPHINE SULFATE 10 MG/ML
1 INJECTION, SOLUTION INTRAMUSCULAR; INTRAVENOUS EVERY 5 MIN PRN
Status: DISCONTINUED | OUTPATIENT
Start: 2020-08-20 | End: 2020-08-20 | Stop reason: HOSPADM

## 2020-08-20 RX ORDER — PROMETHAZINE HYDROCHLORIDE 25 MG/ML
6.25 INJECTION, SOLUTION INTRAMUSCULAR; INTRAVENOUS
Status: DISCONTINUED | OUTPATIENT
Start: 2020-08-20 | End: 2020-08-20 | Stop reason: HOSPADM

## 2020-08-20 RX ORDER — MEPERIDINE HYDROCHLORIDE 25 MG/ML
12.5 INJECTION INTRAMUSCULAR; INTRAVENOUS; SUBCUTANEOUS EVERY 5 MIN PRN
Status: DISCONTINUED | OUTPATIENT
Start: 2020-08-20 | End: 2020-08-20 | Stop reason: HOSPADM

## 2020-08-20 RX ORDER — LIDOCAINE HYDROCHLORIDE 20 MG/ML
INJECTION, SOLUTION INFILTRATION; PERINEURAL PRN
Status: DISCONTINUED | OUTPATIENT
Start: 2020-08-20 | End: 2020-08-20 | Stop reason: SDUPTHER

## 2020-08-20 RX ORDER — OXYCODONE HYDROCHLORIDE AND ACETAMINOPHEN 5; 325 MG/1; MG/1
1-2 TABLET ORAL EVERY 4 HOURS PRN
Qty: 25 TABLET | Refills: 0 | Status: SHIPPED | OUTPATIENT
Start: 2020-08-20 | End: 2020-08-27

## 2020-08-20 RX ORDER — MORPHINE SULFATE 10 MG/ML
2 INJECTION, SOLUTION INTRAMUSCULAR; INTRAVENOUS EVERY 5 MIN PRN
Status: DISCONTINUED | OUTPATIENT
Start: 2020-08-20 | End: 2020-08-20 | Stop reason: HOSPADM

## 2020-08-20 RX ORDER — OXYCODONE HYDROCHLORIDE AND ACETAMINOPHEN 5; 325 MG/1; MG/1
1 TABLET ORAL PRN
Status: DISCONTINUED | OUTPATIENT
Start: 2020-08-20 | End: 2020-08-20 | Stop reason: HOSPADM

## 2020-08-20 RX ORDER — OXYCODONE HYDROCHLORIDE AND ACETAMINOPHEN 5; 325 MG/1; MG/1
2 TABLET ORAL PRN
Status: DISCONTINUED | OUTPATIENT
Start: 2020-08-20 | End: 2020-08-20 | Stop reason: HOSPADM

## 2020-08-20 RX ORDER — DIPHENHYDRAMINE HYDROCHLORIDE 50 MG/ML
12.5 INJECTION INTRAMUSCULAR; INTRAVENOUS
Status: DISCONTINUED | OUTPATIENT
Start: 2020-08-20 | End: 2020-08-20 | Stop reason: HOSPADM

## 2020-08-20 RX ORDER — FENTANYL CITRATE 50 UG/ML
INJECTION, SOLUTION INTRAMUSCULAR; INTRAVENOUS PRN
Status: DISCONTINUED | OUTPATIENT
Start: 2020-08-20 | End: 2020-08-20 | Stop reason: SDUPTHER

## 2020-08-20 RX ORDER — HYDRALAZINE HYDROCHLORIDE 20 MG/ML
5 INJECTION INTRAMUSCULAR; INTRAVENOUS EVERY 10 MIN PRN
Status: DISCONTINUED | OUTPATIENT
Start: 2020-08-20 | End: 2020-08-20 | Stop reason: HOSPADM

## 2020-08-20 RX ORDER — SODIUM CHLORIDE 0.9 % (FLUSH) 0.9 %
10 SYRINGE (ML) INJECTION EVERY 12 HOURS SCHEDULED
Status: DISCONTINUED | OUTPATIENT
Start: 2020-08-20 | End: 2020-08-20 | Stop reason: HOSPADM

## 2020-08-20 RX ORDER — MAGNESIUM HYDROXIDE 1200 MG/15ML
LIQUID ORAL CONTINUOUS PRN
Status: COMPLETED | OUTPATIENT
Start: 2020-08-20 | End: 2020-08-20

## 2020-08-20 RX ORDER — SODIUM CHLORIDE, SODIUM LACTATE, POTASSIUM CHLORIDE, CALCIUM CHLORIDE 600; 310; 30; 20 MG/100ML; MG/100ML; MG/100ML; MG/100ML
INJECTION, SOLUTION INTRAVENOUS CONTINUOUS
Status: DISCONTINUED | OUTPATIENT
Start: 2020-08-20 | End: 2020-08-20 | Stop reason: HOSPADM

## 2020-08-20 RX ORDER — SODIUM CHLORIDE 0.9 % (FLUSH) 0.9 %
10 SYRINGE (ML) INJECTION PRN
Status: DISCONTINUED | OUTPATIENT
Start: 2020-08-20 | End: 2020-08-20 | Stop reason: HOSPADM

## 2020-08-20 RX ORDER — ONDANSETRON 2 MG/ML
4 INJECTION INTRAMUSCULAR; INTRAVENOUS PRN
Status: DISCONTINUED | OUTPATIENT
Start: 2020-08-20 | End: 2020-08-20 | Stop reason: HOSPADM

## 2020-08-20 RX ORDER — LIDOCAINE HYDROCHLORIDE 10 MG/ML
1 INJECTION, SOLUTION EPIDURAL; INFILTRATION; INTRACAUDAL; PERINEURAL
Status: DISCONTINUED | OUTPATIENT
Start: 2020-08-20 | End: 2020-08-20 | Stop reason: HOSPADM

## 2020-08-20 RX ORDER — BUPIVACAINE HYDROCHLORIDE 5 MG/ML
INJECTION, SOLUTION EPIDURAL; INTRACAUDAL PRN
Status: DISCONTINUED | OUTPATIENT
Start: 2020-08-20 | End: 2020-08-20 | Stop reason: ALTCHOICE

## 2020-08-20 RX ORDER — LIDOCAINE HYDROCHLORIDE 10 MG/ML
INJECTION, SOLUTION EPIDURAL; INFILTRATION; INTRACAUDAL; PERINEURAL PRN
Status: DISCONTINUED | OUTPATIENT
Start: 2020-08-20 | End: 2020-08-20 | Stop reason: ALTCHOICE

## 2020-08-20 RX ORDER — MIDAZOLAM HYDROCHLORIDE 1 MG/ML
INJECTION INTRAMUSCULAR; INTRAVENOUS PRN
Status: DISCONTINUED | OUTPATIENT
Start: 2020-08-20 | End: 2020-08-20 | Stop reason: SDUPTHER

## 2020-08-20 RX ORDER — PROPOFOL 10 MG/ML
INJECTION, EMULSION INTRAVENOUS PRN
Status: DISCONTINUED | OUTPATIENT
Start: 2020-08-20 | End: 2020-08-20 | Stop reason: SDUPTHER

## 2020-08-20 RX ORDER — LABETALOL HYDROCHLORIDE 5 MG/ML
5 INJECTION, SOLUTION INTRAVENOUS EVERY 10 MIN PRN
Status: DISCONTINUED | OUTPATIENT
Start: 2020-08-20 | End: 2020-08-20 | Stop reason: HOSPADM

## 2020-08-20 RX ADMIN — Medication 2 G: at 08:10

## 2020-08-20 RX ADMIN — SODIUM CHLORIDE, POTASSIUM CHLORIDE, SODIUM LACTATE AND CALCIUM CHLORIDE: 600; 310; 30; 20 INJECTION, SOLUTION INTRAVENOUS at 08:23

## 2020-08-20 RX ADMIN — MIDAZOLAM 2 MG: 1 INJECTION INTRAMUSCULAR; INTRAVENOUS at 08:23

## 2020-08-20 RX ADMIN — FENTANYL CITRATE 50 MCG: 50 INJECTION INTRAMUSCULAR; INTRAVENOUS at 08:23

## 2020-08-20 RX ADMIN — PROPOFOL 480 MG: 10 INJECTION, EMULSION INTRAVENOUS at 08:29

## 2020-08-20 RX ADMIN — SODIUM CHLORIDE, POTASSIUM CHLORIDE, SODIUM LACTATE AND CALCIUM CHLORIDE: 600; 310; 30; 20 INJECTION, SOLUTION INTRAVENOUS at 07:34

## 2020-08-20 RX ADMIN — LIDOCAINE HYDROCHLORIDE 50 MG: 20 INJECTION, SOLUTION INFILTRATION; PERINEURAL at 08:29

## 2020-08-20 ASSESSMENT — PULMONARY FUNCTION TESTS
PIF_VALUE: 0

## 2020-08-20 ASSESSMENT — PAIN - FUNCTIONAL ASSESSMENT: PAIN_FUNCTIONAL_ASSESSMENT: 0-10

## 2020-08-20 ASSESSMENT — PAIN SCALES - GENERAL
PAINLEVEL_OUTOF10: 0
PAINLEVEL_OUTOF10: 0

## 2020-08-20 NOTE — H&P
HISTORY & PHYSICAL    Admit Date:  2020    Subjective:  37 y.o. female who is seen for evaluation of bunion left foot with contraction of digits 2,3,4 left foot. Past Medical History:        Diagnosis Date    DVT (deep vein thrombosis) in pregnancy     Gall stones     PE (pulmonary embolism)     Polycystic ovarian disease     Thyroid disease        Past Surgical History:        Procedure Laterality Date    BACK SURGERY       SECTION      HYSTERECTOMY  2018    Lap    HYSTEROSCOPY N/A 2017    DILATATION AND CURETTAGE    TONSILLECTOMY AND ADENOIDECTOMY      TUBAL LIGATION         Current Medications:     ceFAZolin  2 g Intravenous Once    sodium chloride flush  10 mL Intravenous 2 times per day       Allergies:  Patient has no known allergies. Social History:    Social History     Tobacco Use    Smoking status: Former Smoker     Packs/day: 0.00     Types: Cigarettes    Smokeless tobacco: Former User    Tobacco comment: 16   Substance Use Topics    Alcohol use: No    Drug use: No       Family History:   History reviewed. No pertinent family history. Review of Systems    CONSTITUTIONAL:  negative  EYES:  negative  HEENT:  negative  RESPIRATORY:  negative  CARDIOVASCULAR:  negative  GASTROINTESTINAL:  negative  GENITOURINARY:  negative  INTEGUMENT/BREAST:  negative  MUSCULOSKELETAL:  positive for  pain  NEUROLOGICAL:  negative      Objective:   /65   Pulse 69   Temp 97.4 °F (36.3 °C) (Temporal)   Resp 16   Ht 5' 9\" (1.753 m)   Wt 212 lb (96.2 kg)   LMP 2017   SpO2 100%   BMI 31.31 kg/m²     Data:  CBC: No results for input(s): WBC, HGB, HCT, MCV, PLT in the last 72 hours. BMP: No results for input(s): NA, K, CL, CO2, PHOS, BUN, CREATININE in the last 72 hours. Invalid input(s): CA  LIVER PROFILE: No results for input(s): AST, ALT, LIPASE, BILIDIR, BILITOT, ALKPHOS in the last 72 hours. Invalid input(s):   AMYLASE,  ALB  PT/INR: No results for input(s): PROT, INR in the last 72 hours. HgBA1c:No results found for: LABA1C    Cultures:     Imaging:     Physical Exam:    General Appearance: alert and oriented to person, place and time, well developed and well- nourished, in no acute distress  Skin: warm and dry, no rash or erythema  Head: normocephalic and atraumatic  Eyes: pupils equal, round, and reactive to light, extraocular eye movements intact, conjunctivae normal  ENT: tympanic membrane, external ear and ear canal normal bilaterally, nose without deformity, nasal mucosa and turbinates normal without polyps  Neck: supple and non-tender without mass, no thyromegaly or thyroid nodules, no cervical lymphadenopathy  Pulmonary/Chest: clear to auscultation bilaterally- no wheezes, rales or rhonchi, normal air movement, no respiratory distress  Cardiovascular: normal rate, regular rhythm, normal S1 and S2, no murmurs, rubs, clicks, or gallops, distal pulses intact, no carotid bruits  Abdomen: soft, non-tender, non-distended, normal bowel sounds, no masses or organomegaly    DP/PT palpable left foot   Mild erythema overlying medial aspect 1st MH left and dorsal aspect left 2nd PIPJ. Bony prominence left 1st MH medial aspect. Contraction of digits 2,3,4 left foot. Left 2nd digit only partially reducible. Bowstrung extensor tendons 2,3,4 left foot. Assessment:  Patient Active Problem List   Diagnosis Code    Abnormal uterine bleeding (AUB) N93.9    History of pulmonary embolus (PE) Z86.711    S/P hysterectomy Z90.710     Bunionectomy left  Hammertoe left 2,3,4  Contracted joints 2,3,4 left      Plan  Patient examined. Discussed risks, complications, alternatives and benefits with patient. Understands chance of nonhealing wound, infection, need for further surgery, loss of limb or life. Questions answered.    Plan for bunionectomy left foot, arthroplasty left 2nd digit, C&T left 2nd, 3rd, 4th MPJ, flexor tenotomy left 3rd and 4th digits. The patient was counseled at length about the risks of aguila Covid-19 during their perioperative period and any recovery window from their procedure. The patient was made aware that aguila Covid-19  may worsen their prognosis for recovering from their procedure  and lend to a higher morbidity and/or mortality risk. All material risks, benefits, and reasonable alternatives including postponing the procedure were discussed. The patient does wish to proceed with the procedure at this time.            Electronically signed by Anna Marie Larsen DPM on 8/20/2020 at 7:51 AM.

## 2020-08-20 NOTE — ANESTHESIA PRE PROCEDURE
Department of Anesthesiology  Preprocedure Note       Name:  Veronika Gomez   Age:  37 y.o.  :  1980                                          MRN:  [id number]         Date:  2020      Surgeon: Nestor Stephenson):  Del Jackson DPM    Procedure: Procedure(s):  BUNIONECTOMY LEFT FOOT, ARTHROPLASTY LEFT SECOND DIGIT, CAPSULOTOMY AND TENOTOMY LEFT SECOND-THIRD-AND FOURTH METATARSAL PHALANGEAL JOINTS, FLEXOR TENOTOMY LEFT THIRD AND FOURTH DIGITS    Medications prior to admission:   Prior to Admission medications    Medication Sig Start Date End Date Taking? Authorizing Provider   citalopram (CELEXA) 40 MG tablet Take 40 mg by mouth daily   Yes Historical Provider, MD   Cetirizine HCl (ZYRTEC PO) Take 1 mg by mouth daily    Yes Historical Provider, MD   ferrous sulfate 325 (65 Fe) MG tablet Take 325 mg by mouth daily (with breakfast)   Yes Historical Provider, MD   fluticasone (VERAMYST) 27.5 MCG/SPRAY nasal spray 2 sprays by Each Nostril route daily   Yes Historical Provider, MD   pantoprazole (PROTONIX) 40 MG tablet Take 1 tablet by mouth daily 19  Yes Ade Gold MD   metFORMIN (GLUCOPHAGE) 1000 MG tablet Take 1,000 mg by mouth 2 times daily (with meals)   Yes Historical Provider, MD   vitamin B-12 (CYANOCOBALAMIN) 1000 MCG tablet Take 1,000 mcg by mouth daily   Yes Historical Provider, MD   aspirin 325 MG EC tablet Take 325 mg by mouth daily   Yes Historical Provider, MD   levothyroxine (SYNTHROID) 50 MCG tablet Take 75 mcg by mouth Daily    Yes Historical Provider, MD   methocarbamol (ROBAXIN) 750 MG tablet Take 1 tablet by mouth 4 times daily.   Patient taking differently: Take 750 mg by mouth as needed  3/20/14   Divine De Leon MD       Current medications:    Current Facility-Administered Medications   Medication Dose Route Frequency Provider Last Rate Last Dose    ceFAZolin (ANCEF) 2 g in sterile water 20 mL IV syringe  2 g Intravenous Once Del Jackson DPM        lactated ringers infusion   Intravenous Continuous Jessie Rivas  mL/hr at 20 0734      sodium chloride flush 0.9 % injection 10 mL  10 mL Intravenous 2 times per day Jessie Rivas MD        sodium chloride flush 0.9 % injection 10 mL  10 mL Intravenous PRN Jessie Rivas MD        lidocaine PF 1 % injection 1 mL  1 mL Intradermal Once PRN Jessie Rivas MD           Allergies:  No Known Allergies    Problem List:    Patient Active Problem List   Diagnosis Code    Abnormal uterine bleeding (AUB) N93.9    History of pulmonary embolus (PE) Z86.711    S/P hysterectomy Z90.710       Past Medical History:        Diagnosis Date    DVT (deep vein thrombosis) in pregnancy     Gall stones     PE (pulmonary embolism)     Polycystic ovarian disease     Thyroid disease        Past Surgical History:        Procedure Laterality Date    BACK SURGERY       SECTION      HYSTERECTOMY  2018    Lap    HYSTEROSCOPY N/A 2017    DILATATION AND CURETTAGE    TONSILLECTOMY AND ADENOIDECTOMY      TUBAL LIGATION         Social History:    Social History     Tobacco Use    Smoking status: Former Smoker     Packs/day: 0.00     Types: Cigarettes    Smokeless tobacco: Former User    Tobacco comment: 16   Substance Use Topics    Alcohol use:  No                                Counseling given: Not Answered  Comment: 16      Vital Signs (Current):   Vitals:    20 1436 20 0716   BP:  111/65   Pulse:  69   Resp:  16   Temp:  97.4 °F (36.3 °C)   TempSrc:  Temporal   SpO2:  100%   Weight: 212 lb (96.2 kg)    Height: 5' 9\" (1.753 m)                                               BP Readings from Last 3 Encounters:   20 111/65   19 118/76   19 135/80       NPO Status: Time of last liquid consumption: 0000(sip of water with meds)                        Time of last solid consumption: 0000                        Date of last liquid consumption: 20 Date of last solid food consumption: 08/19/20    BMI:   Wt Readings from Last 3 Encounters:   08/17/20 212 lb (96.2 kg)   08/01/19 250 lb (113.4 kg)   01/02/19 262 lb (118.8 kg)     Body mass index is 31.31 kg/m². CBC:   Lab Results   Component Value Date    WBC 8.6 08/01/2019    RBC 4.88 08/01/2019    HGB 12.1 08/01/2019    HCT 36.8 08/01/2019    MCV 75.3 08/01/2019    RDW 17.3 08/01/2019     08/01/2019       CMP:   Lab Results   Component Value Date     08/01/2019    K 3.7 08/01/2019    K 4.5 01/10/2018    CL 98 08/01/2019    CO2 24 08/01/2019    BUN 12 08/01/2019    CREATININE 0.6 08/01/2019    GFRAA >60 08/01/2019    GFRAA >60 02/10/2011    AGRATIO 1.2 08/01/2019    LABGLOM >60 08/01/2019    GLUCOSE 98 08/01/2019    PROT 7.3 08/01/2019    PROT 7.5 02/10/2011    CALCIUM 9.1 08/01/2019    BILITOT 0.5 08/01/2019    ALKPHOS 81 08/01/2019    AST 26 08/01/2019    ALT 23 08/01/2019       POC Tests: No results for input(s): POCGLU, POCNA, POCK, POCCL, POCBUN, POCHEMO, POCHCT in the last 72 hours.     Coags:   Lab Results   Component Value Date    PROTIME 53.5 02/18/2016    INR 4.99 02/18/2016       HCG (If Applicable):   Lab Results   Component Value Date    PREGTESTUR Negative 01/09/2018        ABGs: No results found for: PHART, PO2ART, NDJ1TYT, YHO5WGA, BEART, M1FOHAGN     Type & Screen (If Applicable):  No results found for: LABABO, LABRH    Drug/Infectious Status (If Applicable):  No results found for: HIV, HEPCAB    COVID-19 Screening (If Applicable):   Lab Results   Component Value Date    COVID19 NOT DETECTED 08/18/2020         Anesthesia Evaluation  Patient summary reviewed and Nursing notes reviewed  Airway: Mallampati: II  TM distance: >3 FB   Neck ROM: full  Mouth opening: < 3 FB Dental: normal exam         Pulmonary:Negative Pulmonary ROS and normal exam  breath sounds clear to auscultation                             Cardiovascular:Negative CV ROS            Rhythm: regular  Rate: normal                    Neuro/Psych:   Negative Neuro/Psych ROS              GI/Hepatic/Renal: Neg GI/Hepatic/Renal ROS            Endo/Other:                      ROS comment: PCOS Abdominal:           Vascular:   + DVT, PE. Anesthesia Plan      MAC     ASA 3       Induction: intravenous. Anesthetic plan and risks discussed with patient. Plan discussed with CRNA.                 Beka Schwab MD   8/20/2020

## 2020-08-20 NOTE — ANESTHESIA POSTPROCEDURE EVALUATION
Department of Anesthesiology  Postprocedure Note    Patient: Gregory Gonsalves  MRN: [id number]  YOB: 1980  Date of evaluation: 8/20/2020  Time:  12:09 PM     Procedure Summary     Date:  08/20/20 Room / Location:  [address] [place] [address]    Anesthesia Start:  4448 Anesthesia Stop:  2234    Procedure:  BUNIONECTOMY LEFT FOOT, ARTHROPLASTY LEFT SECOND DIGIT, CAPSULOTOMY AND TENOTOMY LEFT SECOND-THIRD-AND FOURTH METATARSAL PHALANGEAL JOINTS, FLEXOR TENOTOMY LEFT THIRD AND FOURTH DIGITS WITH GRAFT APPLICATION (Left Foot) Diagnosis:  (BUNION, HAMMER TOES, CONTRACTED JOINTS)    Surgeon:  Cynthia Patel DPM Responsible Provider:  Veda Brasher MD    Anesthesia Type:  MAC ASA Status:  3          Anesthesia Type: MAC    Dany Phase I: Dany Score: 10    Dany Phase II: Dany Score: 10    Last vitals: Reviewed and per EMR flowsheets.        Anesthesia Post Evaluation    Patient location during evaluation: PACU  Patient participation: complete - patient participated  Level of consciousness: awake and alert  Pain score: 0  Airway patency: patent  Nausea & Vomiting: no nausea and no vomiting  Complications: no  Cardiovascular status: blood pressure returned to baseline  Respiratory status: acceptable  Hydration status: stable

## 2020-08-22 NOTE — OP NOTE
[address]                 [address] [facility], [place]                                OPERATIVE REPORT    PATIENT NAME: Ade Browne                 :        1980  MED REC NO:   [id number]                          ROOM:  ACCOUNT NO:   [de-identified]                           ADMIT DATE: 2020  PROVIDER:     Josemanuel Shook DPM    DATE OF PROCEDURE:  2020    PREOPERATIVE DIAGNOSES:  1. Bunion. 2.  Hammertoes. 3.  Contracted joints. POSTOPERATIVE DIAGNOSES:  1. Bunion. 2.  Hammertoes. 3.  Contracted joints. NAME OF PROCEDURES:  1. Bunionectomy, left foot. 2.  Arthroplasty, left second digit. 3.  Capsulotomy and tenotomy, left second, third, and fourth  metatarsophalangeal joints. 4.  Flexor tenotomy, left third and fourth digits. SURGEON:  Josemanuel Shook DPM    ANESTHESIA:  Local MAC. HEMOSTASIS:  Ankle tourniquet 250 mmHg. ESTIMATED BLOOD LOSS:  Less than 50 mL. REPORT OF OPERATION:  The patient was brought into the operating room  and placed on the operating table in the supine position. Under mild IV  sedation, the left first, second, third, and fourth rays were  anaesthetized with 20 mL of 1:1 mixture of 1% lidocaine plain and 0.5%  Marcaine plain. The foot was then scrubbed, prepped, and draped in the  usual sterile manner. Esmarch was then utilized to exsanguinate the  left foot. Ankle tourniquet was then inflated to 250 mmHg. Attention was then directed to the plantar aspect of the left fourth  digit where a small incision was then made overlying the proximal  interphalangeal joint. Dissection was carried down in order to expose  the flexor tendon. The tendon was then transected in total.  Digit was  then extended and noted to sit in a more rectus position at this time. Surgical site was then irrigated with sterile saline.   Skin edges were  then reapproximated with 3-0 nylon in simple interrupted portion of  the AmnioFlo was then injected into the surgical site in order to  stimulate wound healing and decreased scar tissue. Attention was then directed to the left first ray where approximately 3  to 3.5 cm linear longitudinal incision made overlying the dorsal aspect  of the first ray. Dissection was carried down in order to expose the  extensor tendon and the capsule. A linear longitudinal capsulotomy was  then performed medial to the extensor tendon. Dissection was carried  down in order to expose the bony prominence on the dorsal and medial  aspect of the first metatarsal.  Combination of rongeur and sagittal saw  was then utilized to transect the bony prominences. These were also  smoothed with a bone rasp. No further bony prominences or rough edges  were noted. Surgical site was then copiously irrigated with sterile  saline via the pulse lavage. There was mild arthritic changes noted in  the joint itself. At this time, the capsule was reapproximated with 3-0  Vicryl in a simple interrupted suture technique. Skin edges were  reapproximated with 3-0 nylon in a simple interrupted suture technique. At this time, the remaining portion of the AmnioFlo was then injected  into the surgical site in order to stimulate wound healing and decrease  scar tissue. Surgical sites were then covered with  Xeroform gauze,  fluffs, Kerlix, ABD, and Coban. Tourniquet was then deflated. The patient tolerated the procedure and anesthesia well and transferred  to recovery room with vital signs stable and vascular status intact with  evidence for a prompt hyperemic response to all digits of the left foot. Following a brief period of postoperative monitoring, the patient will  be transferred to home with following written and verbal instructions:  1. Keep dressing clean, dry, and intact. 2.  Wear the postop shoe when ambulating. 3.  Contact Dr. Cortney Mullen for postop care or if any problems occur.       HIEN GAVIN JAY    D: 08/21/2020 14:42:17       T: 08/21/2020 17:26:21     [email or web address]  Job#: [id number]     Doc#: [id number]    CC:

## 2020-09-30 ENCOUNTER — TELEPHONE (OUTPATIENT)
Dept: MAMMOGRAPHY | Age: 40
End: 2020-09-30

## 2020-09-30 ENCOUNTER — HOSPITAL ENCOUNTER (OUTPATIENT)
Dept: MAMMOGRAPHY | Age: 40
Discharge: HOME | End: 2020-09-30
Payer: COMMERCIAL

## 2020-09-30 PROCEDURE — 77063 BREAST TOMOSYNTHESIS BI: CPT

## 2020-10-08 ENCOUNTER — HOSPITAL ENCOUNTER (OUTPATIENT)
Dept: ULTRASOUND IMAGING | Age: 40
Discharge: HOME | End: 2020-10-08
Payer: COMMERCIAL

## 2020-10-08 ENCOUNTER — HOSPITAL ENCOUNTER (OUTPATIENT)
Dept: MAMMOGRAPHY | Age: 40
Discharge: HOME | End: 2020-10-08
Payer: COMMERCIAL

## 2020-10-08 PROCEDURE — 76642 ULTRASOUND BREAST LIMITED: CPT

## 2020-10-08 PROCEDURE — G0279 TOMOSYNTHESIS, MAMMO: HCPCS

## 2020-10-09 ENCOUNTER — HOSPITAL ENCOUNTER (OUTPATIENT)
Age: 40
Discharge: HOME | End: 2020-10-09
Payer: COMMERCIAL

## 2020-10-09 PROCEDURE — U0003 INFECTIOUS AGENT DETECTION BY NUCLEIC ACID (DNA OR RNA); SEVERE ACUTE RESPIRATORY SYNDROME CORONAVIRUS 2 (SARS-COV-2) (CORONAVIRUS DISEASE [COVID-19]), AMPLIFIED PROBE TECHNIQUE, MAKING USE OF HIGH THROUGHPUT TECHNOLOGIES AS DESCRIBED BY CMS-2020-01-R: HCPCS

## 2020-10-09 NOTE — PROGRESS NOTES
PRE OP INSTRUCTION SHEET   1. Do not eat or drink anything after 12 midnight  prior to surgery. This includes no water, chewing gum or mints. 2. Take the following pills will a small sip of water (see MAR)                                        3. Aspirin, Ibuprofen, Advil, Naproxen, Vitamin E, fish oil and other Anti-inflammatory products should be stopped for 5 days before surgery or as directed by your physician. 4. Check with your Doctor regarding stopping Plavix, Coumadin, Lovenox, Fragmin or other blood thinners   5. Do not smoke, and do not drink any alcoholic beverages 24 hours prior to surgery. This includes NA Beer. 6. You may brush your teeth and gargle the morning of surgery. DO NOT SWALLOW WATER   7. You MUST make arrangements for a responsible adult to take you home after your surgery. You will not be allowed to leave alone or drive yourself home. It is strongly suggested someone stay with you the first 24 hrs. Your surgery will be cancelled if you do not have a ride home. 8. A parent/legal guardian must accompany a child scheduled for surgery and plan to stay at the hospital until the child is discharged. Please do not bring other children with you. 9. Please wear simple, loose fitting clothing to the hospital.  [place] not bring valuables (money, credit cards, checkbooks, etc.) Do not wear any makeup (including no eye makeup) or nail polish on your fingers or toes. 10. DO NOT wear any jewelry or piercings on day of surgery. All body piercing jewelry must be removed. 11. If you have dentures,glasses, or contacts they will be removed before going to the OR; we will provide you a container. 12. Please see your family doctor/and cardiologist for a history & physical and/or concerning medications. Bring any test results/reports from your physician's office. Have history and labs faxed to [phone].  Remember to bring Blood Bank bracelet on the day of surgery. 14. If you have a Living Will and Durable Power of  for Healthcare, please bring in a copy. 13. Notify your Surgeon if you develop any illness between now and surgery  time, cough, cold, fever, sore throat, nausea, vomiting, etc.  Please notify your surgeon if you experience dizziness, shortness of breath or blurred vision between now & the time of your surgery   16. DO NOT shave your operative site 96 hours prior to surgery. For face & neck surgery, men may use an electric razor 48 hours prior to surgery. 17. Shower with _x__Antibacterial soap (x_chlorhexidine for total joint  Pt's) shower two times before surgery.(the morning of and the night before. 18. To provide excellent care visitors will be limited to one in the room at any given time.   Please call pre admission testing if you any further questions [phone] or [phone]

## 2020-10-11 LAB — SARS-COV-2, NAA: NOT DETECTED

## 2020-10-14 ENCOUNTER — ANESTHESIA EVENT (OUTPATIENT)
Dept: OPERATING ROOM | Age: 40
End: 2020-10-14
Payer: COMMERCIAL

## 2020-10-15 ENCOUNTER — HOSPITAL ENCOUNTER (OUTPATIENT)
Age: 40
Setting detail: OUTPATIENT SURGERY
Discharge: HOME | End: 2020-10-15
Attending: PODIATRIST | Admitting: PODIATRIST
Payer: COMMERCIAL

## 2020-10-15 ENCOUNTER — APPOINTMENT (OUTPATIENT)
Dept: GENERAL RADIOLOGY | Age: 40
End: 2020-10-15
Attending: PODIATRIST
Payer: COMMERCIAL

## 2020-10-15 ENCOUNTER — ANESTHESIA (OUTPATIENT)
Dept: OPERATING ROOM | Age: 40
End: 2020-10-15
Payer: COMMERCIAL

## 2020-10-15 VITALS
RESPIRATION RATE: 18 BRPM | TEMPERATURE: 97.8 F | WEIGHT: 212 LBS | OXYGEN SATURATION: 100 % | DIASTOLIC BLOOD PRESSURE: 81 MMHG | HEIGHT: 69 IN | BODY MASS INDEX: 31.4 KG/M2 | SYSTOLIC BLOOD PRESSURE: 120 MMHG | HEART RATE: 74 BPM

## 2020-10-15 VITALS
RESPIRATION RATE: 20 BRPM | DIASTOLIC BLOOD PRESSURE: 72 MMHG | OXYGEN SATURATION: 100 % | TEMPERATURE: 98.6 F | SYSTOLIC BLOOD PRESSURE: 117 MMHG

## 2020-10-15 PROCEDURE — 3700000001 HC ADD 15 MINUTES (ANESTHESIA): Performed by: PODIATRIST

## 2020-10-15 PROCEDURE — 2580000003 HC RX 258: Performed by: ANESTHESIOLOGY

## 2020-10-15 PROCEDURE — 2580000003 HC RX 258: Performed by: PODIATRIST

## 2020-10-15 PROCEDURE — 2780000010 HC IMPLANT OTHER: Performed by: PODIATRIST

## 2020-10-15 PROCEDURE — 2500000003 HC RX 250 WO HCPCS: Performed by: ANESTHESIOLOGY

## 2020-10-15 PROCEDURE — 2709999900 HC NON-CHARGEABLE SUPPLY: Performed by: PODIATRIST

## 2020-10-15 PROCEDURE — 3600000013 HC SURGERY LEVEL 3 ADDTL 15MIN: Performed by: PODIATRIST

## 2020-10-15 PROCEDURE — 3700000000 HC ANESTHESIA ATTENDED CARE: Performed by: PODIATRIST

## 2020-10-15 PROCEDURE — 6360000002 HC RX W HCPCS: Performed by: PODIATRIST

## 2020-10-15 PROCEDURE — 6370000000 HC RX 637 (ALT 250 FOR IP): Performed by: ANESTHESIOLOGY

## 2020-10-15 PROCEDURE — 7100000010 HC PHASE II RECOVERY - FIRST 15 MIN: Performed by: PODIATRIST

## 2020-10-15 PROCEDURE — 6360000002 HC RX W HCPCS

## 2020-10-15 PROCEDURE — 73620 X-RAY EXAM OF FOOT: CPT

## 2020-10-15 PROCEDURE — 2500000003 HC RX 250 WO HCPCS

## 2020-10-15 PROCEDURE — 2500000003 HC RX 250 WO HCPCS: Performed by: PODIATRIST

## 2020-10-15 PROCEDURE — 7100000011 HC PHASE II RECOVERY - ADDTL 15 MIN: Performed by: PODIATRIST

## 2020-10-15 PROCEDURE — 3600000003 HC SURGERY LEVEL 3 BASE: Performed by: PODIATRIST

## 2020-10-15 DEVICE — ALLOGRAFT HUM TISS 1 CC AMNIO TISS MEMBRN AMNIFLO CRYOPRES: Type: IMPLANTABLE DEVICE | Site: FOOT | Status: FUNCTIONAL

## 2020-10-15 RX ORDER — ONDANSETRON 2 MG/ML
4 INJECTION INTRAMUSCULAR; INTRAVENOUS
Status: DISCONTINUED | OUTPATIENT
Start: 2020-10-15 | End: 2020-10-15 | Stop reason: HOSPADM

## 2020-10-15 RX ORDER — SODIUM CHLORIDE 0.9 % (FLUSH) 0.9 %
10 SYRINGE (ML) INJECTION EVERY 12 HOURS SCHEDULED
Status: DISCONTINUED | OUTPATIENT
Start: 2020-10-15 | End: 2020-10-15

## 2020-10-15 RX ORDER — PROPOFOL 10 MG/ML
INJECTION, EMULSION INTRAVENOUS PRN
Status: DISCONTINUED | OUTPATIENT
Start: 2020-10-15 | End: 2020-10-15 | Stop reason: SDUPTHER

## 2020-10-15 RX ORDER — BUPIVACAINE HYDROCHLORIDE 5 MG/ML
INJECTION, SOLUTION EPIDURAL; INTRACAUDAL PRN
Status: DISCONTINUED | OUTPATIENT
Start: 2020-10-15 | End: 2020-10-15 | Stop reason: ALTCHOICE

## 2020-10-15 RX ORDER — OXYCODONE HYDROCHLORIDE AND ACETAMINOPHEN 5; 325 MG/1; MG/1
1 TABLET ORAL PRN
Status: COMPLETED | OUTPATIENT
Start: 2020-10-15 | End: 2020-10-15

## 2020-10-15 RX ORDER — MAGNESIUM HYDROXIDE 1200 MG/15ML
LIQUID ORAL CONTINUOUS PRN
Status: COMPLETED | OUTPATIENT
Start: 2020-10-15 | End: 2020-10-15

## 2020-10-15 RX ORDER — MORPHINE SULFATE 10 MG/ML
1 INJECTION, SOLUTION INTRAMUSCULAR; INTRAVENOUS EVERY 5 MIN PRN
Status: DISCONTINUED | OUTPATIENT
Start: 2020-10-15 | End: 2020-10-15 | Stop reason: HOSPADM

## 2020-10-15 RX ORDER — OXYCODONE HYDROCHLORIDE AND ACETAMINOPHEN 5; 325 MG/1; MG/1
2 TABLET ORAL PRN
Status: COMPLETED | OUTPATIENT
Start: 2020-10-15 | End: 2020-10-15

## 2020-10-15 RX ORDER — SODIUM CHLORIDE 0.9 % (FLUSH) 0.9 %
10 SYRINGE (ML) INJECTION PRN
Status: DISCONTINUED | OUTPATIENT
Start: 2020-10-15 | End: 2020-10-15 | Stop reason: HOSPADM

## 2020-10-15 RX ORDER — MEPERIDINE HYDROCHLORIDE 25 MG/ML
12.5 INJECTION INTRAMUSCULAR; INTRAVENOUS; SUBCUTANEOUS EVERY 5 MIN PRN
Status: DISCONTINUED | OUTPATIENT
Start: 2020-10-15 | End: 2020-10-15 | Stop reason: HOSPADM

## 2020-10-15 RX ORDER — SODIUM CHLORIDE 0.9 % (FLUSH) 0.9 %
10 SYRINGE (ML) INJECTION PRN
Status: DISCONTINUED | OUTPATIENT
Start: 2020-10-15 | End: 2020-10-15

## 2020-10-15 RX ORDER — SODIUM CHLORIDE, SODIUM LACTATE, POTASSIUM CHLORIDE, CALCIUM CHLORIDE 600; 310; 30; 20 MG/100ML; MG/100ML; MG/100ML; MG/100ML
INJECTION, SOLUTION INTRAVENOUS CONTINUOUS
Status: DISCONTINUED | OUTPATIENT
Start: 2020-10-15 | End: 2020-10-15

## 2020-10-15 RX ORDER — MORPHINE SULFATE 10 MG/ML
2 INJECTION, SOLUTION INTRAMUSCULAR; INTRAVENOUS EVERY 5 MIN PRN
Status: DISCONTINUED | OUTPATIENT
Start: 2020-10-15 | End: 2020-10-15 | Stop reason: HOSPADM

## 2020-10-15 RX ORDER — LIDOCAINE HYDROCHLORIDE 10 MG/ML
0.3 INJECTION, SOLUTION EPIDURAL; INFILTRATION; INTRACAUDAL; PERINEURAL
Status: DISCONTINUED | OUTPATIENT
Start: 2020-10-15 | End: 2020-10-15 | Stop reason: HOSPADM

## 2020-10-15 RX ORDER — LIDOCAINE HYDROCHLORIDE 10 MG/ML
INJECTION, SOLUTION EPIDURAL; INFILTRATION; INTRACAUDAL; PERINEURAL PRN
Status: DISCONTINUED | OUTPATIENT
Start: 2020-10-15 | End: 2020-10-15 | Stop reason: ALTCHOICE

## 2020-10-15 RX ORDER — SODIUM CHLORIDE 0.9 % (FLUSH) 0.9 %
10 SYRINGE (ML) INJECTION EVERY 12 HOURS SCHEDULED
Status: DISCONTINUED | OUTPATIENT
Start: 2020-10-15 | End: 2020-10-15 | Stop reason: HOSPADM

## 2020-10-15 RX ORDER — SODIUM CHLORIDE, SODIUM LACTATE, POTASSIUM CHLORIDE, CALCIUM CHLORIDE 600; 310; 30; 20 MG/100ML; MG/100ML; MG/100ML; MG/100ML
INJECTION, SOLUTION INTRAVENOUS CONTINUOUS
Status: DISCONTINUED | OUTPATIENT
Start: 2020-10-15 | End: 2020-10-15 | Stop reason: HOSPADM

## 2020-10-15 RX ORDER — OXYCODONE HYDROCHLORIDE AND ACETAMINOPHEN 5; 325 MG/1; MG/1
1-2 TABLET ORAL EVERY 4 HOURS PRN
Qty: 25 TABLET | Refills: 0 | Status: SHIPPED | OUTPATIENT
Start: 2020-10-15 | End: 2020-10-22

## 2020-10-15 RX ORDER — LIDOCAINE HYDROCHLORIDE 20 MG/ML
INJECTION, SOLUTION INFILTRATION; PERINEURAL PRN
Status: DISCONTINUED | OUTPATIENT
Start: 2020-10-15 | End: 2020-10-15 | Stop reason: SDUPTHER

## 2020-10-15 RX ADMIN — SODIUM CHLORIDE, POTASSIUM CHLORIDE, SODIUM LACTATE AND CALCIUM CHLORIDE: 600; 310; 30; 20 INJECTION, SOLUTION INTRAVENOUS at 07:04

## 2020-10-15 RX ADMIN — Medication 2 G: at 07:56

## 2020-10-15 RX ADMIN — LIDOCAINE HYDROCHLORIDE 100 MG: 20 INJECTION, SOLUTION INFILTRATION; PERINEURAL at 08:01

## 2020-10-15 RX ADMIN — OXYCODONE HYDROCHLORIDE AND ACETAMINOPHEN 1 TABLET: 5; 325 TABLET ORAL at 08:56

## 2020-10-15 RX ADMIN — FAMOTIDINE 20 MG: 10 INJECTION, SOLUTION INTRAVENOUS at 07:04

## 2020-10-15 RX ADMIN — PROPOFOL 600 MG: 10 INJECTION, EMULSION INTRAVENOUS at 08:01

## 2020-10-15 ASSESSMENT — PULMONARY FUNCTION TESTS
PIF_VALUE: 0

## 2020-10-15 ASSESSMENT — LIFESTYLE VARIABLES: SMOKING_STATUS: 0

## 2020-10-15 ASSESSMENT — PAIN DESCRIPTION - ONSET: ONSET: GRADUAL

## 2020-10-15 ASSESSMENT — PAIN DESCRIPTION - PROGRESSION: CLINICAL_PROGRESSION: GRADUALLY WORSENING

## 2020-10-15 ASSESSMENT — PAIN DESCRIPTION - FREQUENCY: FREQUENCY: INTERMITTENT

## 2020-10-15 ASSESSMENT — PAIN DESCRIPTION - PAIN TYPE: TYPE: ACUTE PAIN

## 2020-10-15 ASSESSMENT — PAIN SCALES - GENERAL
PAINLEVEL_OUTOF10: 0
PAINLEVEL_OUTOF10: 5
PAINLEVEL_OUTOF10: 5

## 2020-10-15 ASSESSMENT — PAIN - FUNCTIONAL ASSESSMENT: PAIN_FUNCTIONAL_ASSESSMENT: 0-10

## 2020-10-15 ASSESSMENT — PAIN DESCRIPTION - ORIENTATION: ORIENTATION: RIGHT

## 2020-10-15 ASSESSMENT — PAIN DESCRIPTION - DESCRIPTORS: DESCRIPTORS: ACHING;BURNING

## 2020-10-15 ASSESSMENT — PAIN DESCRIPTION - LOCATION: LOCATION: FOOT

## 2020-10-15 ASSESSMENT — ENCOUNTER SYMPTOMS: SHORTNESS OF BREATH: 0

## 2020-10-15 NOTE — H&P
HISTORY & PHYSICAL    Admit Date:  10/15/2020    Subjective:  37 y.o. female who is seen for evaluation of bunion right foot with contraction of digits 2,3,4 right foot. Past Medical History:        Diagnosis Date    DVT (deep vein thrombosis) in pregnancy     Gall stones     PE (pulmonary embolism)     Polycystic ovarian disease     Thyroid disease        Past Surgical History:        Procedure Laterality Date    ARTHROPLASTY Left 8/20/2020    BUNIONECTOMY LEFT FOOT, ARTHROPLASTY LEFT SECOND DIGIT, CAPSULOTOMY AND TENOTOMY LEFT SECOND-THIRD-AND FOURTH METATARSAL PHALANGEAL JOINTS, FLEXOR TENOTOMY LEFT THIRD AND FOURTH DIGITS WITH GRAFT APPLICATION performed by Eleuterio Michelle DPM at [facility].      HYSTERECTOMY  01/09/2018    Lap    HYSTEROSCOPY N/A 04/28/2017    DILATATION AND CURETTAGE    TONSILLECTOMY AND ADENOIDECTOMY      TUBAL LIGATION         Current Medications:     sodium chloride flush  10 mL Intravenous 2 times per day       Allergies:  Patient has no known allergies. Social History:    Social History     Tobacco Use    Smoking status: Former Smoker     Packs/day: 0.00     Types: Cigarettes    Smokeless tobacco: Former User    Tobacco comment: 1/28/16   Substance Use Topics    Alcohol use: No    Drug use: No       Family History:   History reviewed. No pertinent family history. Review of Systems    CONSTITUTIONAL:  negative  EYES:  negative  HEENT:  negative  RESPIRATORY:  negative  CARDIOVASCULAR:  negative  GASTROINTESTINAL:  negative  GENITOURINARY:  negative  INTEGUMENT/BREAST:  negative  MUSCULOSKELETAL:  positive for pain  NEUROLOGICAL:  negative      Objective:   /70   Pulse 69   Temp 97.8 °F (36.6 °C) (Temporal)   Resp 14   Ht 5' 9\" (1.753 m)   Wt 212 lb (96.2 kg)   LMP 04/20/2017   SpO2 99%   BMI 31.31 kg/m²     Data:  CBC: No results for input(s): WBC, HGB, HCT, MCV, PLT in the last 72 hours.   BMP: No results for input(s): NA, K, CL, CO2, PHOS, BUN, CREATININE in the last 72 hours. Invalid input(s): CA  LIVER PROFILE: No results for input(s): AST, ALT, LIPASE, BILIDIR, BILITOT, ALKPHOS in the last 72 hours. Invalid input(s): AMYLASE,  ALB  PT/INR: No results for input(s): PROT, INR in the last 72 hours. HgBA1c:No results found for: LABA1C    Cultures:     Imaging: xray right foot - bony prominence dorsal medial aspect 1st MH and 2nd digit    Physical Exam:    General Appearance: alert and oriented to person, place and time, well developed and well- nourished, in no acute distress  Skin: warm and dry, no rash or erythema  Head: normocephalic and atraumatic  Eyes: pupils equal, round, and reactive to light, extraocular eye movements intact, conjunctivae normal  ENT: tympanic membrane, external ear and ear canal normal bilaterally, nose without deformity, nasal mucosa and turbinates normal without polyps  Neck: supple and non-tender without mass, no thyromegaly or thyroid nodules, no cervical lymphadenopathy  Pulmonary/Chest: clear to auscultation bilaterally- no wheezes, rales or rhonchi, normal air movement, no respiratory distress  Cardiovascular: normal rate, regular rhythm, normal S1 and S2, no murmurs, rubs, clicks, or gallops, distal pulses intact, no carotid bruits  Abdomen: soft, non-tender, non-distended, normal bowel sounds, no masses or organomegaly    DP/PT palpable right foot   Mild erythema overlying medial aspect 1st MH right and 2nd digit right foot. Bony prominence right 1st MH medial aspect. Contraction of digits 2,3,4 right foot. Bowstrung extensor tendons 2,3,4 right foot. Assessment:  Patient Active Problem List   Diagnosis Code    Abnormal uterine bleeding (AUB) N93.9    History of pulmonary embolus (PE) Z86.711    S/P hysterectomy Z90.710     Bunionectomy right  Hammertoe right 2,3,4  Contracted joints 2,3,4 right      Plan  Patient examined.   Discussed risks, complications, alternatives and benefits with patient. Understands chance of nonhealing wound, infection, need for further surgery, loss of limb or life. Questions answered. Plan for BUNIONECTOMY RIGHT FOOT, EXOSTECTOMY RIGHT SECOND DIGIT, CAPSULOTOMY AND TENOTOMY RIGHT SECOND, THIRD, AND FOURTH METATARSAL PHALANGEAL JOINT, FLEXOR TENOTOMY RIGHT SECOND, THIRD, AND FOURTH DIGITS       The patient was counseled at length about the risks of aguila Covid-19 during their perioperative period and any recovery window from their procedure. The patient was made aware that aguila Covid-19  may worsen their prognosis for recovering from their procedure  and lend to a higher morbidity and/or mortality risk. All material risks, benefits, and reasonable alternatives including postponing the procedure were discussed. The patient does wish to proceed with the procedure at this time.            Electronically signed by Selam Posada DPM on 10/15/2020 at 7:47 AM.

## 2020-10-15 NOTE — PROGRESS NOTES
Pt is alert and oriented, stable for discharge to home, iv out, cath intact, pressure and dressing applied, pt tolerated well. Surgical site dressings are clean, dry, intact. Pt and family received printed and verbal instructions for post op care at home, and they denied any further questions.  Pt taken out via wheelchair to family's car    Xray completed  Pt received percocet 5/325mg po 1 tab for pain rate of 5    Pt's boyfriend has her printed prescription for Norco to be filled for pt

## 2020-10-15 NOTE — BRIEF OP NOTE
Brief Postoperative Note      Patient: Eleanor Rocha  YOB: 1980  MRN: [id number]    Date of Procedure: 10/15/2020    Pre-Op Diagnosis: BUNION, HAMMER TOE, CONTRACTED JOINT, EXOSTOSIS    Post-Op Diagnosis: Same       Procedure(s):  BUNIONECTOMY RIGHT FOOT, EXOSTECTOMY RIGHT SECOND DIGIT, CAPSULOTOMY AND TENOTOMY RIGHT SECOND, THIRD, AND FOURTH METATARSAL PHALANGEAL JOINT, FLEXOR TENOTOMY RIGHT SECOND, THIRD, AND FOURTH DIGITS    Surgeon(s):  Tara Delgado DPM    Assistant:  Surgical Assistant: Zeb Goodman    Anesthesia: Monitor Anesthesia Care    Estimated Blood Loss (mL): less than 50     Complications: None    Specimens:   * No specimens in log *    Implants:  Implant Name Type Inv.  Item Serial No.  Lot No. LRB No. Used Action   SUPRIYA-ALLOGRAFT AMNION 1.0ML AMNIFLO CRYOPRES - [id number] Bone/Graft/Tissue/Human/Synth SUPRIYA-ALLOGRAFT AMNION 1.0ML AMNIFLO CRYOPRES [id number] BONE BANK ALLOGRAFTS [de-identified] Right 1 Implanted         Drains: * No LDAs found *    Findings: exostosis, contracted joints    Electronically signed by Tara Delgado DPM on 10/15/2020 at 8:37 AM

## 2020-10-15 NOTE — ANESTHESIA PRE PROCEDURE
Department of Anesthesiology  Preprocedure Note       Name:  Eleanor Rocha   Age:  37 y.o.  :  1980                                          MRN:  [id number]         Date:  10/15/2020      Surgeon: Shayla Bonds):  Tara Delgado DPM    Procedure: Procedure(s):  BUNIONECTOMY RIGHT FOOT, EXOSTECTOMY RIGHT SECOND DIGIT, CAPSULOTOMY AND TENOTOMY RIGHT SECOND, THIRD, AND FOURTH METATARSAL PHALANGEAL JOINT, FLEXOR TENOTOMY RIGHT SECOND, THIRD, AND FOURTH DIGITS    Medications prior to admission:   Prior to Admission medications    Medication Sig Start Date End Date Taking? Authorizing Provider   citalopram (CELEXA) 40 MG tablet Take 40 mg by mouth daily   Yes Historical Provider, MD   Cetirizine HCl (ZYRTEC PO) Take 1 mg by mouth daily    Yes Historical Provider, MD   ferrous sulfate 325 (65 Fe) MG tablet Take 325 mg by mouth daily (with breakfast)   Yes Historical Provider, MD   fluticasone (VERAMYST) 27.5 MCG/SPRAY nasal spray 2 sprays by Each Nostril route daily   Yes Historical Provider, MD   pantoprazole (PROTONIX) 40 MG tablet Take 1 tablet by mouth daily 19  Yes Cade Horne MD   metFORMIN (GLUCOPHAGE) 1000 MG tablet Take 1,000 mg by mouth 2 times daily (with meals)   Yes Historical Provider, MD   vitamin B-12 (CYANOCOBALAMIN) 1000 MCG tablet Take 1,000 mcg by mouth daily   Yes Historical Provider, MD   aspirin 325 MG EC tablet Take 325 mg by mouth daily   Yes Historical Provider, MD   levothyroxine (SYNTHROID) 50 MCG tablet Take 75 mcg by mouth Daily    Yes Historical Provider, MD   methocarbamol (ROBAXIN) 750 MG tablet Take 1 tablet by mouth 4 times daily.   Patient taking differently: Take 750 mg by mouth as needed  3/20/14  Yes Ileana Delgado MD       Current medications:    Current Facility-Administered Medications   Medication Dose Route Frequency Provider Last Rate Last Dose    ceFAZolin (ANCEF) 2 g in sterile water 20 mL IV syringe  2 g Intravenous Once Tara Delgado DPM        sodium chloride flush 0.9 % injection 10 mL  10 mL Intravenous 2 times per day Shaggy Barbosa MD        lidocaine PF 1 % injection 0.3 mL  0.3 mL Intradermal Once PRN Shaggy Barbosa MD        lactated ringers infusion   Intravenous Continuous Layla Hilario MD        sodium chloride flush 0.9 % injection 10 mL  10 mL Intravenous PRN Layla Hilario MD        famotidine (PEPCID) injection 20 mg  20 mg Intravenous Once Layla Hilario MD           Allergies:  No Known Allergies    Problem List:    Patient Active Problem List   Diagnosis Code    Abnormal uterine bleeding (AUB) N93.9    History of pulmonary embolus (PE) Z86.711    S/P hysterectomy Z90.710       Past Medical History:        Diagnosis Date    DVT (deep vein thrombosis) in pregnancy     Gall stones     PE (pulmonary embolism)     Polycystic ovarian disease     Thyroid disease        Past Surgical History:        Procedure Laterality Date    ARTHROPLASTY Left 8/20/2020    BUNIONECTOMY LEFT FOOT, ARTHROPLASTY LEFT SECOND DIGIT, CAPSULOTOMY AND TENOTOMY LEFT SECOND-THIRD-AND FOURTH METATARSAL PHALANGEAL JOINTS, FLEXOR TENOTOMY LEFT THIRD AND FOURTH DIGITS WITH GRAFT APPLICATION performed by Bethany Chamberlain DPM at [facility].      HYSTERECTOMY  01/09/2018    Lap    HYSTEROSCOPY N/A 04/28/2017    DILATATION AND CURETTAGE    TONSILLECTOMY AND ADENOIDECTOMY      TUBAL LIGATION         Social History:    Social History     Tobacco Use    Smoking status: Former Smoker     Packs/day: 0.00     Types: Cigarettes    Smokeless tobacco: Former User    Tobacco comment: 1/28/16   Substance Use Topics    Alcohol use:  No                                Counseling given: Not Answered  Comment: 1/28/16      Vital Signs (Current):   Vitals:    10/09/20 0916   Weight: 212 lb (96.2 kg)   Height: 5' 9\" (1.753 m)                                              BP Readings from Last 3 Encounters:   08/20/20 106/65   08/20/20 109/73   08/01/19 118/76       NPO Status:  mn+, see mar for am meds                                                                               BMI:   Wt Readings from Last 3 Encounters:   10/09/20 212 lb (96.2 kg)   08/17/20 212 lb (96.2 kg)   08/01/19 250 lb (113.4 kg)     Body mass index is 31.31 kg/m². CBC:   Lab Results   Component Value Date    WBC 8.6 08/01/2019    RBC 4.88 08/01/2019    HGB 12.1 08/01/2019    HCT 36.8 08/01/2019    MCV 75.3 08/01/2019    RDW 17.3 08/01/2019     08/01/2019       CMP:   Lab Results   Component Value Date     08/01/2019    K 3.7 08/01/2019    K 4.5 01/10/2018    CL 98 08/01/2019    CO2 24 08/01/2019    BUN 12 08/01/2019    CREATININE 0.6 08/01/2019    GFRAA >60 08/01/2019    GFRAA >60 02/10/2011    AGRATIO 1.2 08/01/2019    LABGLOM >60 08/01/2019    GLUCOSE 98 08/01/2019    PROT 7.3 08/01/2019    PROT 7.5 02/10/2011    CALCIUM 9.1 08/01/2019    BILITOT 0.5 08/01/2019    ALKPHOS 81 08/01/2019    AST 26 08/01/2019    ALT 23 08/01/2019       POC Tests: No results for input(s): POCGLU, POCNA, POCK, POCCL, POCBUN, POCHEMO, POCHCT in the last 72 hours.     Coags:   Lab Results   Component Value Date    PROTIME 53.5 02/18/2016    INR 4.99 02/18/2016       HCG (If Applicable):   Lab Results   Component Value Date    PREGTESTUR Negative 01/09/2018        ABGs: No results found for: PHART, PO2ART, PVS3OKS, JHW6TCF, BEART, T3QGEVVY     Type & Screen (If Applicable):  No results found for: LABABO, LABRH    Drug/Infectious Status (If Applicable):  No results found for: HIV, HEPCAB    COVID-19 Screening (If Applicable):   Lab Results   Component Value Date    COVID19 NOT DETECTED 10/09/2020         Anesthesia Evaluation  Patient summary reviewed no history of anesthetic complications:   Airway: Mallampati: II  TM distance: >3 FB   Neck ROM: full  Mouth opening: > = 3 FB Dental:          Pulmonary:Negative Pulmonary ROS breath sounds clear to auscultation      (-) COPD, asthma, shortness of breath, sleep apnea and not a current smoker          Patient did not smoke on day of surgery. Cardiovascular:Negative CV ROS        (-) pacemaker, hypertension, past MI, CAD, CABG/stent, dysrhythmias,  angina and  CHF    ECG reviewed  Rhythm: regular  Rate: normal           Beta Blocker:  Not on Beta Blocker         Neuro/Psych:   Negative Neuro/Psych ROS     (-) seizures, TIA, CVA and psychiatric history           GI/Hepatic/Renal:   (+) GERD: well controlled,      (-) liver disease, no renal disease, bowel prep and no morbid obesity       Endo/Other:    (+) hypothyroidism: arthritis:., no malignancy/cancer. (-) diabetes mellitus, hyperthyroidism, blood dyscrasia, no malignancy/cancer               Abdominal:           Vascular:   + DVT, PE (2016, no issues or meds now). Anesthesia Plan      TIVA     ASA 2       Induction: intravenous. MIPS: Postoperative opioids intended and Prophylactic antiemetics administered. Anesthetic plan and risks discussed with patient. Plan discussed with CRNA. This pre-anesthesia assessment may be used as a history and physical.    DOS STAFF ADDENDUM:    Pt seen and examined, chart reviewed (including anesthesia, drug and allergy history). No interval changes to history and physical examination. Anesthetic plan, risks, benefits, alternatives, and personnel involved discussed with patient. Patient verbalized an understanding and agrees to proceed.       Amelia Long MD  October 15, 2020  6:51 AM      Amelia Long MD   10/15/2020

## 2020-10-15 NOTE — OP NOTE
[address]                 [address] [address]                                OPERATIVE REPORT    PATIENT NAME: Suma Sosa                 :        1980  MED REC NO:   [id number]                          ROOM:  ACCOUNT NO:   [de-identified]                           ADMIT DATE: 10/15/2020  PROVIDER:     Aldair Sullivan DPM    DATE OF PROCEDURE:  10/15/2020    PREOPERATIVE DIAGNOSES:  1. Bunion. 2.  Hammertoe. 3.  Contracted joint. 4.  Exostosis. POSTOPERATIVE DIAGNOSES:  1. Bunion. 2.  Hammertoe. 3.  Contracted joint. 4.  Exostosis. PROCEDURE:  1. Bunionectomy, right foot. 2.  Exostectomy, right second digit. 3.  Capsulotomy and tenotomy, right second, third, and fourth  metatarsophalangeal joints. 4.  Flexor tenotomy, right second, third, and fourth digits. HEMOSTASIS:  Ankle tourniquet, 250 mmHg. ESTIMATED BLOOD LOSS:  Less than 50 mL. SURGEON:  Aldair Sullivan DPM    ANESTHESIA:  Local with MAC. REPORT OF OPERATION:  The patient was brought in to the operating room  and placed on the operating table in supine position. Under mild IV  sedation, the right first, second, third, and fourth rays were  anesthetized with 20 mL of 1:1 mixture of 1% lidocaine plain and 0.5%  Marcaine plain. Foot was then scrubbed, prepped, and draped in usual  sterile manner. An Esmarch was then utilized to exsanguinate the right  foot. Ankle tourniquet was then inflated to 250 mmHg. Attention was then directed to the plantar aspect of the right second,  third, and fourth digits where a small incision was then made underlying  the proximal interphalangeal joint of each digit. Soft tissues were  then dissected down in order to expose the flexor tendon. These were  all then transected with a scalpel and the digit was then dorsiflexed. The digits were noted to sit in a more rectus position at this time.    Surgical sites were then reapproximated with 3-0 nylon in simple  interrupted suture technique. Attention was then directed to the dorsal aspect of the right second,  third, and fourth metatarsophalangeal joints where a small incision was  then made. Dissection was carried down in order to expose the extensor  tendons and the capsule at the metatarsal.  At this time, a scalpel was  utilized to transect the extensor tendon and the capsule at respective  metatarsophalangeal joint. Digit was then plantarflexed. Digit was  noted to sit in a more rectus position at this time. Surgical sites  were then irrigated with sterile saline. The skin edges were then  reapproximated at each site with 3-0 nylon in a simple interrupted  suture technique. Attention was then directed to the right second digit overlying the  dorsomedial aspect of the distal interphalangeal joint. A small  incision was then made. Dissection was carried down in order to expose  the bony prominence on the medial aspect of the distal interphalangeal  joint. Combination of the rongeur as well as bone rasp was then  utilized to excise the bony prominence in this area. Surgical site was  then copiously irrigated with sterile saline. Skin edges were  reapproximated with 3-0 nylon in simple interrupted suture technique. Attention was then directed to the right first ray. Approximately 3 cm  linear longitudinal incision was then made overlying the dorsal aspect  of the first metatarsophalangeal joint and the hallux. Dissection was  carried down in order to expose the bony prominence on the dorsal and  medial aspects of the first metatarsal.  A linear longitudinal  capsulotomy had been performed. At this time, a sagittal saw was then  utilized to resect the bony prominence on the dorsal and medial aspect  of the first metatarsal.  Bone rasp was then utilized to smooth all  rough edges.   Surgical site was inspected and no further bony  prominences or rough edges were

## 2021-03-22 ENCOUNTER — HOSPITAL ENCOUNTER (OUTPATIENT)
Dept: MAMMOGRAPHY | Age: 41
Discharge: HOME | End: 2021-03-22
Payer: COMMERCIAL

## 2021-03-22 DIAGNOSIS — Z12.31 ENCOUNTER FOR SCREENING MAMMOGRAM FOR BREAST CANCER: ICD-10-CM

## 2021-03-22 PROCEDURE — G0279 TOMOSYNTHESIS, MAMMO: HCPCS

## 2021-05-18 ENCOUNTER — HOSPITAL ENCOUNTER (OUTPATIENT)
Dept: GENERAL RADIOLOGY | Age: 41
Discharge: HOME | End: 2021-05-18
Payer: COMMERCIAL

## 2021-05-18 ENCOUNTER — HOSPITAL ENCOUNTER (OUTPATIENT)
Age: 41
Discharge: HOME | End: 2021-05-18
Payer: COMMERCIAL

## 2021-05-18 DIAGNOSIS — M20.40 ACQUIRED HAMMER TOE: ICD-10-CM

## 2021-05-18 PROCEDURE — 73630 X-RAY EXAM OF FOOT: CPT

## 2021-09-13 ENCOUNTER — HOSPITAL ENCOUNTER (OUTPATIENT)
Dept: MAMMOGRAPHY | Age: 41
Discharge: HOME | End: 2021-09-13
Payer: COMMERCIAL

## 2021-09-13 DIAGNOSIS — Z12.39 SCREENING BREAST EXAMINATION: ICD-10-CM

## 2021-09-13 PROCEDURE — 77066 DX MAMMO INCL CAD BI: CPT

## 2021-11-01 NOTE — PROGRESS NOTES
PRE OP INSTRUCTION SHEET   1. Do not eat or drink anything after 12 midnight  prior to surgery. This includes no water, chewing gum or mints. 2. Take the following pills will a small sip of water (see MAR)                                        3. Aspirin, Ibuprofen, Advil, Naproxen, Vitamin E, fish oil and other Anti-inflammatory products should be stopped for 5 days before surgery or as directed by your physician. 4. Check with your Doctor regarding stopping Plavix, Coumadin, Lovenox, Fragmin or other blood thinners   5. Do not smoke, and do not drink any alcoholic beverages 24 hours prior to surgery. This includes NA Beer. 6. You may brush your teeth and gargle the morning of surgery. DO NOT SWALLOW WATER   7. You MUST make arrangements for a responsible adult to take you home after your surgery. You will not be allowed to leave alone or drive yourself home. It is strongly suggested someone stay with you the first 24 hrs. Your surgery will be cancelled if you do not have a ride home. 8. A parent/legal guardian must accompany a child scheduled for surgery and plan to stay at the hospital until the child is discharged. Please do not bring other children with you. 9. Please wear simple, loose fitting clothing to the hospital.  Gina Paz not bring valuables (money, credit cards, checkbooks, etc.) Do not wear any makeup (including no eye makeup) or nail polish on your fingers or toes. 10. DO NOT wear any jewelry or piercings on day of surgery. All body piercing jewelry must be removed. 11. If you have dentures,glasses, or contacts they will be removed before going to the OR; we will provide you a container. 12. Please see your family doctor/and cardiologist for a history & physical and/or concerning medications. Bring any test results/reports from your physician's office. Have history and labs faxed to [phone].  Remember to bring Blood Bank bracelet on the day of surgery. 14. If you have a Living Will and Durable Power of  for Healthcare, please bring in a copy. 13. Notify your Surgeon if you develop any illness between now and surgery  time, cough, cold, fever, sore throat, nausea, vomiting, etc.  Please notify your surgeon if you experience dizziness, shortness of breath or blurred vision between now & the time of your surgery   16. DO NOT shave your operative site 96 hours prior to surgery. For face & neck surgery, men may use an electric razor 48 hours prior to surgery. 17. Shower with _x__Antibacterial soap (x_chlorhexidine for total joint  Pt's) shower two times before surgery.(the morning of and the night before. 18. To provide excellent care visitors will be limited to one in the room at any given time.   Please call pre admission testing if you any further questions [phone] or [phone]

## 2021-11-08 ENCOUNTER — HOSPITAL ENCOUNTER (OUTPATIENT)
Age: 41
Discharge: HOME | End: 2021-11-08
Payer: COMMERCIAL

## 2021-11-08 LAB — SARS-COV-2, PCR: NOT DETECTED

## 2021-11-08 PROCEDURE — U0005 INFEC AGEN DETEC AMPLI PROBE: HCPCS

## 2021-11-08 PROCEDURE — U0003 INFECTIOUS AGENT DETECTION BY NUCLEIC ACID (DNA OR RNA); SEVERE ACUTE RESPIRATORY SYNDROME CORONAVIRUS 2 (SARS-COV-2) (CORONAVIRUS DISEASE [COVID-19]), AMPLIFIED PROBE TECHNIQUE, MAKING USE OF HIGH THROUGHPUT TECHNOLOGIES AS DESCRIBED BY CMS-2020-01-R: HCPCS

## 2021-11-11 ENCOUNTER — ANESTHESIA EVENT (OUTPATIENT)
Dept: OPERATING ROOM | Age: 41
End: 2021-11-11
Payer: COMMERCIAL

## 2021-11-11 ENCOUNTER — APPOINTMENT (OUTPATIENT)
Dept: GENERAL RADIOLOGY | Age: 41
End: 2021-11-11
Attending: PODIATRIST
Payer: COMMERCIAL

## 2021-11-11 ENCOUNTER — ANESTHESIA (OUTPATIENT)
Dept: OPERATING ROOM | Age: 41
End: 2021-11-11
Payer: COMMERCIAL

## 2021-11-11 ENCOUNTER — HOSPITAL ENCOUNTER (OUTPATIENT)
Age: 41
Setting detail: OUTPATIENT SURGERY
Discharge: HOME | End: 2021-11-11
Attending: PODIATRIST | Admitting: PODIATRIST
Payer: COMMERCIAL

## 2021-11-11 VITALS
SYSTOLIC BLOOD PRESSURE: 125 MMHG | HEART RATE: 62 BPM | OXYGEN SATURATION: 100 % | TEMPERATURE: 97.1 F | DIASTOLIC BLOOD PRESSURE: 79 MMHG | WEIGHT: 210 LBS | HEIGHT: 69 IN | BODY MASS INDEX: 31.1 KG/M2 | RESPIRATION RATE: 16 BRPM

## 2021-11-11 VITALS — SYSTOLIC BLOOD PRESSURE: 119 MMHG | DIASTOLIC BLOOD PRESSURE: 77 MMHG | OXYGEN SATURATION: 98 %

## 2021-11-11 DIAGNOSIS — G89.18 POSTOPERATIVE PAIN: Primary | ICD-10-CM

## 2021-11-11 PROCEDURE — 7100000011 HC PHASE II RECOVERY - ADDTL 15 MIN: Performed by: PODIATRIST

## 2021-11-11 PROCEDURE — 6360000002 HC RX W HCPCS: Performed by: PODIATRIST

## 2021-11-11 PROCEDURE — 73620 X-RAY EXAM OF FOOT: CPT

## 2021-11-11 PROCEDURE — 3600000003 HC SURGERY LEVEL 3 BASE: Performed by: PODIATRIST

## 2021-11-11 PROCEDURE — 7100000010 HC PHASE II RECOVERY - FIRST 15 MIN: Performed by: PODIATRIST

## 2021-11-11 PROCEDURE — 2580000003 HC RX 258: Performed by: NURSE ANESTHETIST, CERTIFIED REGISTERED

## 2021-11-11 PROCEDURE — 3600000013 HC SURGERY LEVEL 3 ADDTL 15MIN: Performed by: PODIATRIST

## 2021-11-11 PROCEDURE — 3700000000 HC ANESTHESIA ATTENDED CARE: Performed by: PODIATRIST

## 2021-11-11 PROCEDURE — 2500000003 HC RX 250 WO HCPCS: Performed by: NURSE ANESTHETIST, CERTIFIED REGISTERED

## 2021-11-11 PROCEDURE — 2709999900 HC NON-CHARGEABLE SUPPLY: Performed by: PODIATRIST

## 2021-11-11 PROCEDURE — 6360000002 HC RX W HCPCS: Performed by: NURSE ANESTHETIST, CERTIFIED REGISTERED

## 2021-11-11 PROCEDURE — 3700000001 HC ADD 15 MINUTES (ANESTHESIA): Performed by: PODIATRIST

## 2021-11-11 PROCEDURE — 2780000010 HC IMPLANT OTHER: Performed by: PODIATRIST

## 2021-11-11 PROCEDURE — 2500000003 HC RX 250 WO HCPCS: Performed by: PODIATRIST

## 2021-11-11 DEVICE — ALLOGRAFT HUM TISS 1 CC AMNIO TISS MEMBRN AMNIFLO CRYOPRES: Type: IMPLANTABLE DEVICE | Site: FOOT | Status: FUNCTIONAL

## 2021-11-11 RX ORDER — OXYCODONE HYDROCHLORIDE AND ACETAMINOPHEN 5; 325 MG/1; MG/1
2 TABLET ORAL PRN
Status: DISCONTINUED | OUTPATIENT
Start: 2021-11-11 | End: 2021-11-11 | Stop reason: HOSPADM

## 2021-11-11 RX ORDER — SODIUM CHLORIDE, SODIUM LACTATE, POTASSIUM CHLORIDE, CALCIUM CHLORIDE 600; 310; 30; 20 MG/100ML; MG/100ML; MG/100ML; MG/100ML
INJECTION, SOLUTION INTRAVENOUS CONTINUOUS PRN
Status: DISCONTINUED | OUTPATIENT
Start: 2021-11-11 | End: 2021-11-11 | Stop reason: SDUPTHER

## 2021-11-11 RX ORDER — LIDOCAINE HYDROCHLORIDE 10 MG/ML
INJECTION, SOLUTION EPIDURAL; INFILTRATION; INTRACAUDAL; PERINEURAL PRN
Status: DISCONTINUED | OUTPATIENT
Start: 2021-11-11 | End: 2021-11-11 | Stop reason: ALTCHOICE

## 2021-11-11 RX ORDER — MONTELUKAST SODIUM 10 MG/1
10 TABLET ORAL NIGHTLY
COMMUNITY

## 2021-11-11 RX ORDER — LIDOCAINE HYDROCHLORIDE 10 MG/ML
2 INJECTION, SOLUTION EPIDURAL; INFILTRATION; INTRACAUDAL; PERINEURAL
Status: DISCONTINUED | OUTPATIENT
Start: 2021-11-11 | End: 2021-11-11 | Stop reason: HOSPADM

## 2021-11-11 RX ORDER — MORPHINE SULFATE 2 MG/ML
2 INJECTION, SOLUTION INTRAMUSCULAR; INTRAVENOUS EVERY 5 MIN PRN
Status: DISCONTINUED | OUTPATIENT
Start: 2021-11-11 | End: 2021-11-11 | Stop reason: HOSPADM

## 2021-11-11 RX ORDER — LIDOCAINE HYDROCHLORIDE 20 MG/ML
INJECTION, SOLUTION EPIDURAL; INFILTRATION; INTRACAUDAL; PERINEURAL PRN
Status: DISCONTINUED | OUTPATIENT
Start: 2021-11-11 | End: 2021-11-11 | Stop reason: SDUPTHER

## 2021-11-11 RX ORDER — DIPHENHYDRAMINE HYDROCHLORIDE 50 MG/ML
12.5 INJECTION INTRAMUSCULAR; INTRAVENOUS
Status: DISCONTINUED | OUTPATIENT
Start: 2021-11-11 | End: 2021-11-11 | Stop reason: HOSPADM

## 2021-11-11 RX ORDER — BUPIVACAINE HYDROCHLORIDE 5 MG/ML
INJECTION, SOLUTION EPIDURAL; INTRACAUDAL PRN
Status: DISCONTINUED | OUTPATIENT
Start: 2021-11-11 | End: 2021-11-11 | Stop reason: ALTCHOICE

## 2021-11-11 RX ORDER — PROPOFOL 10 MG/ML
INJECTION, EMULSION INTRAVENOUS PRN
Status: DISCONTINUED | OUTPATIENT
Start: 2021-11-11 | End: 2021-11-11 | Stop reason: SDUPTHER

## 2021-11-11 RX ORDER — ONDANSETRON 2 MG/ML
4 INJECTION INTRAMUSCULAR; INTRAVENOUS PRN
Status: DISCONTINUED | OUTPATIENT
Start: 2021-11-11 | End: 2021-11-11 | Stop reason: HOSPADM

## 2021-11-11 RX ORDER — LABETALOL HYDROCHLORIDE 5 MG/ML
5 INJECTION, SOLUTION INTRAVENOUS EVERY 10 MIN PRN
Status: DISCONTINUED | OUTPATIENT
Start: 2021-11-11 | End: 2021-11-11 | Stop reason: HOSPADM

## 2021-11-11 RX ORDER — HYDRALAZINE HYDROCHLORIDE 20 MG/ML
5 INJECTION INTRAMUSCULAR; INTRAVENOUS EVERY 10 MIN PRN
Status: DISCONTINUED | OUTPATIENT
Start: 2021-11-11 | End: 2021-11-11 | Stop reason: HOSPADM

## 2021-11-11 RX ORDER — PROMETHAZINE HYDROCHLORIDE 25 MG/ML
6.25 INJECTION, SOLUTION INTRAMUSCULAR; INTRAVENOUS
Status: DISCONTINUED | OUTPATIENT
Start: 2021-11-11 | End: 2021-11-11 | Stop reason: HOSPADM

## 2021-11-11 RX ORDER — SODIUM CHLORIDE, SODIUM LACTATE, POTASSIUM CHLORIDE, CALCIUM CHLORIDE 600; 310; 30; 20 MG/100ML; MG/100ML; MG/100ML; MG/100ML
INJECTION, SOLUTION INTRAVENOUS CONTINUOUS
Status: DISCONTINUED | OUTPATIENT
Start: 2021-11-11 | End: 2021-11-11 | Stop reason: HOSPADM

## 2021-11-11 RX ORDER — OXYCODONE HYDROCHLORIDE AND ACETAMINOPHEN 5; 325 MG/1; MG/1
1 TABLET ORAL PRN
Status: DISCONTINUED | OUTPATIENT
Start: 2021-11-11 | End: 2021-11-11 | Stop reason: HOSPADM

## 2021-11-11 RX ORDER — OXYCODONE HYDROCHLORIDE AND ACETAMINOPHEN 5; 325 MG/1; MG/1
1-2 TABLET ORAL EVERY 4 HOURS PRN
Qty: 25 TABLET | Refills: 0 | Status: SHIPPED | OUTPATIENT
Start: 2021-11-11 | End: 2021-11-18

## 2021-11-11 RX ORDER — MEPERIDINE HYDROCHLORIDE 25 MG/ML
12.5 INJECTION INTRAMUSCULAR; INTRAVENOUS; SUBCUTANEOUS EVERY 5 MIN PRN
Status: DISCONTINUED | OUTPATIENT
Start: 2021-11-11 | End: 2021-11-11 | Stop reason: HOSPADM

## 2021-11-11 RX ORDER — MORPHINE SULFATE 2 MG/ML
1 INJECTION, SOLUTION INTRAMUSCULAR; INTRAVENOUS EVERY 5 MIN PRN
Status: DISCONTINUED | OUTPATIENT
Start: 2021-11-11 | End: 2021-11-11 | Stop reason: HOSPADM

## 2021-11-11 RX ORDER — FENTANYL CITRATE 50 UG/ML
INJECTION, SOLUTION INTRAMUSCULAR; INTRAVENOUS PRN
Status: DISCONTINUED | OUTPATIENT
Start: 2021-11-11 | End: 2021-11-11 | Stop reason: SDUPTHER

## 2021-11-11 RX ADMIN — Medication 2000 MG: at 07:55

## 2021-11-11 RX ADMIN — FENTANYL CITRATE 50 MCG: 50 INJECTION INTRAMUSCULAR; INTRAVENOUS at 08:02

## 2021-11-11 RX ADMIN — SODIUM CHLORIDE, POTASSIUM CHLORIDE, SODIUM LACTATE AND CALCIUM CHLORIDE: 600; 310; 30; 20 INJECTION, SOLUTION INTRAVENOUS at 07:54

## 2021-11-11 RX ADMIN — LIDOCAINE HYDROCHLORIDE 50 MG: 20 INJECTION, SOLUTION EPIDURAL; INFILTRATION; INTRACAUDAL; PERINEURAL at 08:02

## 2021-11-11 RX ADMIN — FENTANYL CITRATE 50 MCG: 50 INJECTION INTRAMUSCULAR; INTRAVENOUS at 07:54

## 2021-11-11 RX ADMIN — PROPOFOL 200 MG: 10 INJECTION, EMULSION INTRAVENOUS at 08:02

## 2021-11-11 ASSESSMENT — PULMONARY FUNCTION TESTS
PIF_VALUE: 0

## 2021-11-11 ASSESSMENT — PAIN - FUNCTIONAL ASSESSMENT: PAIN_FUNCTIONAL_ASSESSMENT: 0-10

## 2021-11-11 NOTE — PROGRESS NOTES
Received report from SKYLAR Bello and Jose Myers RN. VSS with Sp02 95% on r/a. Left foot has koban and surgical shoe in place. Pt awakens on her own and voices no c/o any at this time. Will continue to monitor.

## 2021-11-11 NOTE — ANESTHESIA POSTPROCEDURE EVALUATION
Department of Anesthesiology  Postprocedure Note    Patient: Jairo Mcmahon  MRN: [id number]  YOB: 1980  Date of evaluation: 11/11/2021  Time:  11:06 AM     Procedure Summary     Date: 11/11/21 Room / Location: [address] [place] [address] / [facility]    Anesthesia Start: 4995 Anesthesia Stop: 6844    Procedure: ARTHROPLASTY, LEFT SECOND TOE (Right Foot) Diagnosis: (HAMMER TOE RIGHT SECOND TOE)    Surgeons: Carly Schultz DPM Responsible Provider: Roshan Ortega MD    Anesthesia Type: MAC ASA Status: 2          Anesthesia Type: MAC    Dany Phase I: Dany Score: 10    Dany Phase II: Dany Score: 10    Last vitals: Reviewed and per EMR flowsheets.        Anesthesia Post Evaluation    Patient location during evaluation: PACU  Patient participation: complete - patient participated  Level of consciousness: awake and alert  Pain score: 0  Airway patency: patent  Nausea & Vomiting: no nausea and no vomiting  Complications: no  Cardiovascular status: blood pressure returned to baseline  Respiratory status: acceptable  Hydration status: stable

## 2021-11-11 NOTE — H&P
HISTORY & PHYSICAL    Admit Date:  2021    Subjective:  35 y.o. female who is seen for evaluation of hammertoe left 2nd digit. Has failed conservative care. Past Medical History:        Diagnosis Date    DVT (deep vein thrombosis) in pregnancy     Gall stones     PE (pulmonary embolism)     Polycystic ovarian disease     Thyroid disease        Past Surgical History:        Procedure Laterality Date    ARTHROPLASTY Left 2020    BUNIONECTOMY LEFT FOOT, ARTHROPLASTY LEFT SECOND DIGIT, CAPSULOTOMY AND TENOTOMY LEFT SECOND-THIRD-AND FOURTH METATARSAL PHALANGEAL JOINTS, FLEXOR TENOTOMY LEFT THIRD AND FOURTH DIGITS WITH GRAFT APPLICATION performed by Jamaal Hyde DPM at [facility] 94 Right 10/15/2020    10/15/20 Dr Sueann Saint did BUNIONECTOMY RIGHT FOOT, EXOSTECTOMY RIGHT SECOND DIGIT, CAPSULOTOMY AND TENOTOMY RIGHT SECOND, THIRD, AND FOURTH METATARSAL PHALANGEAL JOINT, FLEXOR TENOTOMY RIGHT SECOND, THIRD, AND FOURTH DIGITS     SECTION      HYSTERECTOMY  2018    Lap    HYSTEROSCOPY N/A 2017    DILATATION AND CURETTAGE    TOE AMPUTATION Right 10/15/2020    BUNIONECTOMY RIGHT FOOT, EXOSTECTOMY RIGHT SECOND DIGIT, CAPSULOTOMY AND TENOTOMY RIGHT SECOND, THIRD, AND FOURTH METATARSAL PHALANGEAL JOINT, FLEXOR TENOTOMY RIGHT SECOND, THIRD, AND FOURTH DIGITS, GRAFT APPLICATION performed by Jamaal Hyde DPM at [address] [facility]         Current Medications:     ceFAZolin  2,000 mg IntraVENous Once       Allergies:  Patient has no known allergies. Social History:    Social History     Tobacco Use    Smoking status: Former Smoker     Packs/day: 0.00     Types: Cigarettes    Smokeless tobacco: Former User    Tobacco comment: 16   Vaping Use    Vaping Use: Never used   Substance Use Topics    Alcohol use: No    Drug use: No       Family History:   History reviewed. No pertinent family history.     Review of Systems    CONSTITUTIONAL:  negative  EYES:  negative  HEENT:  negative  RESPIRATORY:  negative  CARDIOVASCULAR:  negative  GASTROINTESTINAL:  negative  GENITOURINARY:  negative  INTEGUMENT/BREAST:  negative  MUSCULOSKELETAL:  positive for  pain  NEUROLOGICAL:  negative      Objective:   /86   Pulse 66   Temp 97.1 °F (36.2 °C) (Infrared)   Resp 20   Ht 5' 9\" (1.753 m)   Wt 210 lb (95.3 kg)   LMP 04/20/2017   SpO2 99%   BMI 31.01 kg/m²     Data:  CBC: No results for input(s): WBC, HGB, HCT, MCV, PLT in the last 72 hours. BMP: No results for input(s): NA, K, CL, CO2, PHOS, BUN, CREATININE in the last 72 hours. Invalid input(s): CA  LIVER PROFILE: No results for input(s): AST, ALT, LIPASE, BILIDIR, BILITOT, ALKPHOS in the last 72 hours. Invalid input(s): AMYLASE,  ALB  PT/INR: No results for input(s): PROT, INR in the last 72 hours.   HgBA1c:No results found for: LABA1C    Cultures:     Imaging: xray left foot - prior surgery at [facility] 2nd digit    Physical Exam:    General Appearance: alert and oriented to person, place and time, well developed and well- nourished, in no acute distress  Skin: warm and dry, no rash or erythema  Head: normocephalic and atraumatic  Eyes: pupils equal, round, and reactive to light, extraocular eye movements intact, conjunctivae normal  ENT: tympanic membrane, external ear and ear canal normal bilaterally, nose without deformity, nasal mucosa and turbinates normal without polyps  Neck: supple and non-tender without mass, no thyromegaly or thyroid nodules, no cervical lymphadenopathy  Pulmonary/Chest: clear to auscultation bilaterally- no wheezes, rales or rhonchi, normal air movement, no respiratory distress  Cardiovascular: normal rate, regular rhythm, normal S1 and S2, no murmurs, rubs, clicks, or gallops, distal pulses intact, no carotid bruits  Abdomen: soft, non-tender, non-distended, normal bowel sounds, no masses or organomegaly    DP/PT palpable left foot  No open lesions noted. Contraction left 2nd digit with exostosis noted at the [facility]. Assessment:  Patient Active Problem List   Diagnosis Code    Abnormal uterine bleeding (AUB) N93.9    History of pulmonary embolus (PE) Z86.711    S/P hysterectomy Z90.710     Hammertoe left 2nd digit    Plan  Patient examined. Discussed risks, complications, alternatives and benefits with patient. Understands chance of nonhealing wound, infection, need for further surgery, loss of limb or life. Questions answered.          Electronically signed by Mars Clemons DPM on 11/11/2021 at 7:40 AM.

## 2021-11-11 NOTE — OP NOTE
[address]                 [address] [address]                                OPERATIVE REPORT    PATIENT NAME: Dolores Mullen                 :        1980  MED REC NO:   [id number]                          ROOM:  ACCOUNT NO:   [de-identified]                           ADMIT DATE: 2021  PROVIDER:     Helene Thao DPM    DATE OF PROCEDURE:  2021    PREOPERATIVE DIAGNOSIS:  Hammertoe of left second digit. POSTOPERATIVE DIAGNOSIS:  Hammertoe of left second digit. NAME OF PROCEDURE:  Arthroplasty of left second digit. SURGEON:  Helene Thao DPM    ANESTHESIA:  Local with MAC. HEMOSTASIS:  Ankle tourniquet at 250 mmHg. ESTIMATED BLOOD LOSS:  Less than 50 mL. REPORT OF OPERATION:  The patient was brought into the operating room  and placed on the operating table in supine position. Under mild IV  sedation, the left second digit was anesthetized with 10 mL of 1:1  mixture of 1% lidocaine plain and 0.5% Marcaine plain. The foot was  then scrubbed, prepped, and draped in usual sterile manner. Esmarch was  then utilized to exsanguinate the left foot, and ankle tourniquet was  then inflated to 250 mmHg. Attention was then directed to the dorsal aspect of the left second  digit where approximately 1.5 cm linear longitudinal incision was made  overlying the dorsal aspect of the proximal interphalangeal joint. Dissection was carried down in order to expose the extensor tendon and  proximal interphalangeal joint. These were then transected. Dissection  was carried down in order to expose the distal portion of the proximal  phalanx. At this time, the sagittal saw was then utilized to excise the  distal portion of the proximal phalanx. The distal fragment was then  passed off the operative field in toto. Scar tissue in this area was  also excised.   It was noted that there was a fairly extensive amount of  scar tissue in this area as well. The digit was noted to sit in a more  rectus position at this time and have less bony prominence noted as well  on the medial and lateral aspect of the digit. Surgical site was then  copiously irrigated with sterile saline via the pulse lavage. All  bleeders were cauterized as necessary. Skin edges were then  reapproximated with 3-0 nylon in a simple interrupted suture technique. At this time, we injected  1 mL of AmnioFlo into the surgical site in  order to stimulate wound healing in a high-risk diabetic patient. At  this time, we applied Xeroform to the surgical site and covered with  gauze, fluffs, Kerlix, ABD, and Coban. Tourniquet was then deflated. The patient tolerated the procedure and anesthesia well, and transferred  to the recovery room with vital signs stable and vascular status intact. There was evidence for a prompt hyperemic response to all digits of the  left foot. Following a brief period of postoperative monitoring, the  patient will be transferred home with the following written and verbal  instructions:  1. Keep dressing clean, dry, and intact. 2.  Wear the postop shoe when ambulating. 3.  Contact Dr. Aye Moon for postop care or if any problems occur.         HIEN JAY DPM    D: 11/11/2021 11:09:24       T: 11/11/2021 14:11:12     [id number]  Job#: [id number]     Doc#: [id number]    CC:

## 2021-11-11 NOTE — ANESTHESIA PRE PROCEDURE
Yakov Salcido MD        lactated ringers infusion   IntraVENous Continuous Pricila Love MD           Allergies:  No Known Allergies    Problem List:    Patient Active Problem List   Diagnosis Code    Abnormal uterine bleeding (AUB) N93.9    History of pulmonary embolus (PE) Z86.711    S/P hysterectomy Z90.710       Past Medical History:        Diagnosis Date    DVT (deep vein thrombosis) in pregnancy     Gall stones     PE (pulmonary embolism)     Polycystic ovarian disease     Thyroid disease        Past Surgical History:        Procedure Laterality Date    ARTHROPLASTY Left 2020    BUNIONECTOMY LEFT FOOT, ARTHROPLASTY LEFT SECOND DIGIT, CAPSULOTOMY AND TENOTOMY LEFT SECOND-THIRD-AND FOURTH METATARSAL PHALANGEAL JOINTS, FLEXOR TENOTOMY LEFT THIRD AND FOURTH DIGITS WITH GRAFT APPLICATION performed by Sara Lancaster DPM at [place] [id number] Right 10/15/2020    10/15/20 Dr Heri Ferrer did BUNIONECTOMY RIGHT FOOT, EXOSTECTOMY RIGHT SECOND DIGIT, CAPSULOTOMY AND TENOTOMY RIGHT SECOND, THIRD, AND FOURTH METATARSAL PHALANGEAL JOINT, FLEXOR TENOTOMY RIGHT SECOND, THIRD, AND FOURTH DIGITS     SECTION      HYSTERECTOMY  2018    Lap    HYSTEROSCOPY N/A 2017    DILATATION AND CURETTAGE    TOE AMPUTATION Right 10/15/2020    BUNIONECTOMY RIGHT FOOT, EXOSTECTOMY RIGHT SECOND DIGIT, CAPSULOTOMY AND TENOTOMY RIGHT SECOND, THIRD, AND FOURTH METATARSAL PHALANGEAL JOINT, FLEXOR TENOTOMY RIGHT SECOND, THIRD, AND FOURTH DIGITS, GRAFT APPLICATION performed by Sara Lancaster DPM at [address] [facility]         Social History:    Social History     Tobacco Use    Smoking status: Former Smoker     Packs/day: 0.00     Types: Cigarettes    Smokeless tobacco: Former User    Tobacco comment: 16   Substance Use Topics    Alcohol use:  No                                Counseling given: Not Answered  Comment: 16      Vital Signs (Current):   Vitals:    11/01/21 1625 11/11/21 0656   BP:  127/86   Pulse:  66   Resp:  20   Temp:  97.1 °F (36.2 °C)   TempSrc:  Infrared   SpO2:  99%   Weight: 210 lb (95.3 kg)    Height: 5' 9\" (1.753 m)                                               BP Readings from Last 3 Encounters:   11/11/21 127/86   10/15/20 117/72   10/15/20 120/81       NPO Status: Time of last liquid consumption: 2340                        Time of last solid consumption: 2340                        Date of last liquid consumption: 11/10/21                        Date of last solid food consumption: 11/10/21    BMI:   Wt Readings from Last 3 Encounters:   11/01/21 210 lb (95.3 kg)   10/15/20 212 lb (96.2 kg)   08/17/20 212 lb (96.2 kg)     Body mass index is 31.01 kg/m². CBC:   Lab Results   Component Value Date    WBC 8.6 08/01/2019    RBC 4.88 08/01/2019    HGB 12.1 08/01/2019    HCT 36.8 08/01/2019    MCV 75.3 08/01/2019    RDW 17.3 08/01/2019     08/01/2019       CMP:   Lab Results   Component Value Date     08/01/2019    K 3.7 08/01/2019    K 4.5 01/10/2018    CL 98 08/01/2019    CO2 24 08/01/2019    BUN 12 08/01/2019    CREATININE 0.6 08/01/2019    GFRAA >60 08/01/2019    GFRAA >60 02/10/2011    AGRATIO 1.2 08/01/2019    LABGLOM >60 08/01/2019    GLUCOSE 98 08/01/2019    PROT 7.3 08/01/2019    PROT 7.5 02/10/2011    CALCIUM 9.1 08/01/2019    BILITOT 0.5 08/01/2019    ALKPHOS 81 08/01/2019    AST 26 08/01/2019    ALT 23 08/01/2019       POC Tests: No results for input(s): POCGLU, POCNA, POCK, POCCL, POCBUN, POCHEMO, POCHCT in the last 72 hours.     Coags:   Lab Results   Component Value Date    PROTIME 53.5 02/18/2016    INR 4.99 02/18/2016       HCG (If Applicable):   Lab Results   Component Value Date    PREGTESTUR Negative 01/09/2018        ABGs: No results found for: PHART, PO2ART, WRB1END, CCY0PES, BEART, X5KEKOKE     Type & Screen (If Applicable):  No results found for: LABABO, LABRH    Drug/Infectious Status (If Applicable):  No results found for: HIV, HEPCAB    COVID-19 Screening (If Applicable):   Lab Results   Component Value Date    COVID19 Not Detected 11/08/2021           Anesthesia Evaluation  Patient summary reviewed and Nursing notes reviewed  Airway: Mallampati: II  TM distance: >3 FB   Neck ROM: full  Mouth opening: > = 3 FB Dental: normal exam         Pulmonary:Negative Pulmonary ROS and normal exam  breath sounds clear to auscultation                             Cardiovascular:Negative CV ROS            Rhythm: regular  Rate: normal                    Neuro/Psych:   Negative Neuro/Psych ROS              GI/Hepatic/Renal: Neg GI/Hepatic/Renal ROS            Endo/Other: Negative Endo/Other ROS                    Abdominal:   (+) obese,           Vascular: negative vascular ROS. Other Findings:             Anesthesia Plan      MAC     ASA 2       Induction: intravenous. Anesthetic plan and risks discussed with patient. Plan discussed with CRNA.                   Bronson Hearn MD   11/11/2021

## 2021-11-11 NOTE — BRIEF OP NOTE
Brief Postoperative Note      Patient: Patrick Adkins  YOB: 1980  MRN: [id number]    Date of Procedure: 11/11/2021    Pre-Op Diagnosis: HAMMER TOE LEFT SECOND TOE    Post-Op Diagnosis: Same       Procedure(s):  ARTHROPLASTY, LEFT SECOND TOE    Surgeon(s):  Renetta Thacker DPM    Assistant:  * No surgical staff found *    Anesthesia: Monitor Anesthesia Care    Estimated Blood Loss (mL): less than 965     Complications: None    Specimens:   * No specimens in log *    Implants:  Implant Name Type Inv.  Item Serial No.  Lot No. LRB No. Used Action   ALLOGRAFT HUM TISS 1 CC AMNIO TISS [employer]  ALLOGRAFT HUM TISS 1 CC AMNIO TISS [employer]OP[employer]  [employer]WD  Left 1 Implanted         Drains: * No LDAs found *    Findings: Hammertoe left 2nd digit    Electronically signed by Renetta Thacker DPM on 11/11/2021 at 8:29 AM

## 2022-09-20 ENCOUNTER — TELEPHONE (OUTPATIENT)
Dept: MAMMOGRAPHY | Age: 42
End: 2022-09-20

## 2022-09-20 ENCOUNTER — HOSPITAL ENCOUNTER (OUTPATIENT)
Dept: MAMMOGRAPHY | Age: 42
Discharge: HOME | End: 2022-09-20
Payer: COMMERCIAL

## 2022-09-20 DIAGNOSIS — Z12.39 SCREENING BREAST EXAMINATION: ICD-10-CM

## 2022-09-20 PROCEDURE — 77063 BREAST TOMOSYNTHESIS BI: CPT

## 2023-09-21 ENCOUNTER — HOSPITAL ENCOUNTER (OUTPATIENT)
Dept: MAMMOGRAPHY | Age: 43
Discharge: HOME | End: 2023-09-21
Payer: COMMERCIAL

## 2023-09-21 DIAGNOSIS — Z12.31 SCREENING MAMMOGRAM, ENCOUNTER FOR: ICD-10-CM

## 2023-09-21 PROCEDURE — 77063 BREAST TOMOSYNTHESIS BI: CPT

## 2024-04-30 ENCOUNTER — OFFICE (OUTPATIENT)
Dept: URBAN - METROPOLITAN AREA CLINIC 18 | Facility: CLINIC | Age: 44
End: 2024-04-30
Payer: COMMERCIAL

## 2024-04-30 DIAGNOSIS — K76.0 FATTY (CHANGE OF) LIVER, NOT ELSEWHERE CLASSIFIED: ICD-10-CM

## 2024-04-30 PROCEDURE — 76981 USE PARENCHYMA: CPT | Performed by: INTERNAL MEDICINE

## 2024-11-13 ENCOUNTER — HOSPITAL ENCOUNTER (OUTPATIENT)
Dept: WOMENS IMAGING | Age: 44
Discharge: HOME | End: 2024-11-13
Payer: COMMERCIAL

## 2024-11-13 VITALS — BODY MASS INDEX: 31.1 KG/M2 | WEIGHT: 210 LBS | HEIGHT: 69 IN

## 2024-11-13 DIAGNOSIS — Z12.31 VISIT FOR SCREENING MAMMOGRAM: ICD-10-CM

## 2024-11-13 PROCEDURE — 77063 BREAST TOMOSYNTHESIS BI: CPT

## (undated) DEVICE — SUTURE ETHLN SZ 3-0 L30IN NONABSORBABLE BLK FSL L30MM 3/8 1671H

## (undated) DEVICE — SYRINGE MED 10ML LUERLOCK TIP W/O SFTY DISP

## (undated) DEVICE — HANDPIECE SET WITH HIGH FLOW TIP AND SUCTION TUBE: Brand: INTERPULSE

## (undated) DEVICE — COTTON UNDERCAST PADDING,CRIMPED FINISH: Brand: WEBRIL

## (undated) DEVICE — SHOE, POST-OPERATIVE: Brand: DEROYAL

## (undated) DEVICE — PAD,ABDOMINAL,8"X10",ST,LF: Brand: MEDLINE

## (undated) DEVICE — BANDAGE,GAUZE,BULKEE II,4.5"X4.1YD,STRL: Brand: MEDLINE

## (undated) DEVICE — MAJOR SET UP PK

## (undated) DEVICE — 3M™ COBAN™ NL STERILE NON-LATEX SELF-ADHERENT WRAP, 2084S, 4 IN X 5 YD (10 CM X 4,5 M), 18 ROLLS/CASE: Brand: 3M™ COBAN™

## (undated) DEVICE — APPLICATOR PREP 26ML 0.7% IOD POVACRYLEX 74% ISO ALC ST

## (undated) DEVICE — BANDAGE COMPR W4INXL12FT E DISP ESMARCH EVEN

## (undated) DEVICE — GAUZE,SPONGE,4"X4",8PLY,STRL,LF,10/TRAY: Brand: MEDLINE

## (undated) DEVICE — TIP SUCT DIA12FR W STYL CTRL VENT DISPOSABLE FRAZ

## (undated) DEVICE — ELECTRODE PT RET AD L9FT HI MOIST COND ADH HYDRGEL CORDED

## (undated) DEVICE — GLOVE ORANGE PI 7 1/2   MSG9075

## (undated) DEVICE — SUTURE VCRL + SZ 3-0 L27IN ABSRB UD L26MM SH 1/2 CIR VCP416H

## (undated) DEVICE — X-RAY CASSETTE COVER: Brand: CONVERTORS

## (undated) DEVICE — SHOE POSTOP M MAN 9-11 UNIV FOAM TRICOT SEMI FLX SKID

## (undated) DEVICE — DRESSING,GAUZE,XEROFORM,CURAD,1"X8",ST: Brand: CURAD

## (undated) DEVICE — INTENDED FOR TISSUE SEPARATION, AND OTHER PROCEDURES THAT REQUIRE A SHARP SURGICAL BLADE TO PUNCTURE OR CUT.: Brand: BARD-PARKER ® STAINLESS STEEL BLADES

## (undated) DEVICE — GOWN SIRUS NONREIN XL W/TWL: Brand: MEDLINE INDUSTRIES, INC.

## (undated) DEVICE — CANNULA NSL 13FT TUBE AD ETCO2 DIV SAMP M

## (undated) DEVICE — NEEDLE HYPO 25GA L1.5IN BLU POLYPR HUB S STL REG BVL STR

## (undated) DEVICE — Z CONVERTED USE 2276073 ROLL BNDG L W4.5INXL4 1/8YD WHT COT 6 PLY CNFRM KERLIX

## (undated) DEVICE — PRECISION THIN, OFFSET (5.5 X 0.38 X 25.0MM)

## (undated) DEVICE — SOLUTION IV IRRIG 500ML 0.9% SODIUM CHL 2F7123

## (undated) DEVICE — PACK ORTH LO EXT VI

## (undated) DEVICE — BLADE SAW SAG OSCIL LNG MED 9X31MM